# Patient Record
Sex: FEMALE | Race: WHITE | HISPANIC OR LATINO | Employment: UNEMPLOYED | URBAN - METROPOLITAN AREA
[De-identification: names, ages, dates, MRNs, and addresses within clinical notes are randomized per-mention and may not be internally consistent; named-entity substitution may affect disease eponyms.]

---
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
## 2024-02-07 ENCOUNTER — CLINICAL SUPPORT (OUTPATIENT)
Dept: OBGYN CLINIC | Facility: CLINIC | Age: 19
End: 2024-02-07

## 2024-02-07 VITALS
HEART RATE: 94 BPM | SYSTOLIC BLOOD PRESSURE: 127 MMHG | DIASTOLIC BLOOD PRESSURE: 81 MMHG | BODY MASS INDEX: 19.87 KG/M2 | WEIGHT: 113 LBS

## 2024-02-07 DIAGNOSIS — Z23 NEED FOR HPV VACCINE: Primary | ICD-10-CM

## 2024-02-07 PROCEDURE — 90471 IMMUNIZATION ADMIN: CPT

## 2024-02-07 PROCEDURE — 90651 9VHPV VACCINE 2/3 DOSE IM: CPT

## 2024-02-07 NOTE — PROGRESS NOTES
HPV vaccine given to pt in right deltoid on 2/7/2024    NDC: 2225-8760-15  LOT: [id number]  EXP: 01/26/2026

## 2024-02-08 LAB
BV WHIFF TEST VAG QL: NORMAL
CLUE CELLS SPEC QL WET PREP: NORMAL
SL AMB POCT WET MOUNT: NORMAL
T VAGINALIS VAG QL WET PREP: NORMAL
YEAST VAG QL WET PREP: NORMAL

## 2024-02-21 ENCOUNTER — ULTRASOUND (OUTPATIENT)
Dept: OBGYN CLINIC | Facility: CLINIC | Age: 19
End: 2024-02-21

## 2024-02-21 VITALS
WEIGHT: 117.4 LBS | DIASTOLIC BLOOD PRESSURE: 79 MMHG | BODY MASS INDEX: 20.8 KG/M2 | HEIGHT: 63 IN | HEART RATE: 103 BPM | SYSTOLIC BLOOD PRESSURE: 115 MMHG

## 2024-02-21 DIAGNOSIS — N92.6 MISSED MENSES: Primary | ICD-10-CM

## 2024-02-21 DIAGNOSIS — Z32.01 POSITIVE PREGNANCY TEST: ICD-10-CM

## 2024-02-21 PROBLEM — F41.9 ANXIETY: Status: RESOLVED | Noted: 2018-06-18 | Resolved: 2024-02-21

## 2024-02-21 PROBLEM — L73.8 PITYROSPORUM FOLLICULITIS: Status: RESOLVED | Noted: 2019-07-11 | Resolved: 2024-02-21

## 2024-02-21 PROBLEM — L70.0 ACNE VULGARIS: Status: RESOLVED | Noted: 2019-07-11 | Resolved: 2024-02-21

## 2024-02-21 PROBLEM — L85.8 KERATOSIS PILARIS: Status: RESOLVED | Noted: 2019-07-11 | Resolved: 2024-02-21

## 2024-02-21 LAB — SL AMB POCT URINE HCG: POSITIVE

## 2024-02-21 PROCEDURE — 76817 TRANSVAGINAL US OBSTETRIC: CPT | Performed by: NURSE PRACTITIONER

## 2024-02-21 PROCEDURE — 81025 URINE PREGNANCY TEST: CPT | Performed by: NURSE PRACTITIONER

## 2024-02-21 PROCEDURE — 99213 OFFICE O/P EST LOW 20 MIN: CPT | Performed by: NURSE PRACTITIONER

## 2024-02-21 RX ORDER — VITAMIN A ACETATE, .BETA.-CAROTENE, ASCORBIC ACID, CHOLECALCIFEROL, .ALPHA.-TOCOPHEROL ACETATE, DL-, THIAMINE MONONITRATE, RIBOFLAVIN, NIACINAMIDE, PYRIDOXINE HYDROCHLORIDE, FOLIC ACID, CYANOCOBALAMIN, CALCIUM CARBONATE, FERROUS FUMARATE, ZINC OXIDE, CUPRIC OXIDE 9.9; 120; 920; 200; 400; 2; 12; 27; 1; 20; 10; 3; 1.84; 3080; 25 MG/1; MG/1; [IU]/1; MG/1; [IU]/1; MG/1; UG/1; MG/1; MG/1; MG/1; MG/1; MG/1; MG/1; [IU]/1; MG/1
1 TABLET, FILM COATED ORAL DAILY
Qty: 90 TABLET | Refills: 4 | Status: SHIPPED | OUTPATIENT
Start: 2024-02-21

## 2024-02-21 NOTE — PATIENT INSTRUCTIONS
Thank you for your confidence in our team.   We appreciate you and welcome your feedback.   If you receive a survey from us, please take a few moments to let us know how we are doing.   Sincerely,  RANCHO Sanchez

## 2024-02-21 NOTE — PROGRESS NOTES
"Kay Barclay is a  who presents today for viability/dating ultrasound.  Patient's last menstrual period was 10/13/2023 (approximate).  She reports positive pregnancy test at home on 2 weeks ago.  She denies vaginal bleeding or abdominal/pelvic pain.    /79   Pulse 103   Ht 5' 3\" (1.6 m)   Wt 53.3 kg (117 lb 6.4 oz)   LMP 10/13/2023 (Approximate)   BMI 20.80 kg/m²   Abdomen soft and non-tender.    Transvaginal Pelvic Ultrasound:  # of fetuses: 1  Intrauterine: yes  Average FL: 1.58cm (14w5d)  EDC based on FL: 2024  Fetal cardiac activity: present  FHR: 152  Additional Findings: + gross fetal movement    Please choose how you are assigning the BENJAMIN: The LMP is unknown or uncertain, therefore the final BENJAMIN will be based on today's ultrasound.    Preliminary BENJAMIN: 2024 by ultrasound at this encounter.    Referral to [facility] ordered for ultrasound/consultation to determine Final BENJAMIN as patient is >14 weeks gestation.  Return in 1-2 weeks for OB intake.  Rx prenatal vitamins sent to pharmacy.    Current Outpatient Medications   Medication Instructions    clindamycin (CLEOCIN T) 1 % external solution Topical, 2 times daily    ketoconazole (NIZORAL) 2 % cream Apply topically twice a day to chest and back x 4 weeks straight, and then 3 times a week    ketoconazole (NIZORAL) 2 % shampoo Use on scalp every time you wash your hair. Leave on for 10 mins then rinse. Can be followed by your regular shampoo and conditioner    Prenatal Vit-Fe Fumarate-FA (Prenatal Plus Vitamin/Mineral) 27-1 MG TABS 1 tablet, Oral, Daily    tretinoin microspheres (RETIN-A MICRO) 0.04 % gel Topical, Daily at bedtime       RANCHO Sanchez  [facility]  [facility]  [address] [place] [id number]  Dept: [phone]  Dept Fax: [phone]  Loc Appt: [phone]  Loc: [phone]  Loc Fax: [phone]  "

## 2024-02-28 ENCOUNTER — INITIAL PRENATAL (OUTPATIENT)
Dept: OBGYN CLINIC | Facility: CLINIC | Age: 19
End: 2024-02-28

## 2024-02-28 VITALS
HEART RATE: 91 BPM | HEIGHT: 63 IN | SYSTOLIC BLOOD PRESSURE: 122 MMHG | DIASTOLIC BLOOD PRESSURE: 81 MMHG | BODY MASS INDEX: 20.87 KG/M2 | WEIGHT: 117.8 LBS

## 2024-02-28 DIAGNOSIS — Z3A.15 15 WEEKS GESTATION OF PREGNANCY: ICD-10-CM

## 2024-02-28 DIAGNOSIS — Z31.430 ENCOUNTER OF FEMALE FOR TESTING FOR GENETIC DISEASE CARRIER STATUS FOR PROCREATIVE MANAGEMENT: ICD-10-CM

## 2024-02-28 DIAGNOSIS — Z34.92 PRENATAL CARE IN SECOND TRIMESTER: Primary | ICD-10-CM

## 2024-02-28 PROCEDURE — 99211 OFF/OP EST MAY X REQ PHY/QHP: CPT

## 2024-02-28 NOTE — PROGRESS NOTES
"OBSTETRIC INTAKE VISIT    Kay Barclay presents today for initial OB visit and intake at 15w5d. LMP - 10/13/23.  History obtained from patient and she reports it as follows:    Past Medical History:   Diagnosis Date    Anemia     Anxiety 2018    Depressed     FTND (full term normal delivery) 2004    Scoliosis     Visual impairment      History reviewed. No pertinent surgical history.  OB History    Para Term  AB Living   1 0 0 0 0 0   SAB IAB Ectopic Multiple Live Births   0 0 0 0 0      # Outcome Date GA Lbr Jerrod/2nd Weight Sex Delivery Anes PTL Lv   1 Current              Social History     Tobacco Use    Smoking status: Never     Passive exposure: Yes    Smokeless tobacco: Never   Vaping Use    Vaping status: Never Used   Substance Use Topics    Alcohol use: Never    Drug use: Never       Current Outpatient Medications   Medication Instructions    clindamycin (CLEOCIN T) 1 % external solution Topical, 2 times daily    ketoconazole (NIZORAL) 2 % cream Apply topically twice a day to chest and back x 4 weeks straight, and then 3 times a week    ketoconazole (NIZORAL) 2 % shampoo Use on scalp every time you wash your hair. Leave on for 10 mins then rinse. Can be followed by your regular shampoo and conditioner    Prenatal Vit-Fe Fumarate-FA (Prenatal Plus Vitamin/Mineral) 27-1 MG TABS 1 tablet, Oral, Daily    tretinoin microspheres (RETIN-A MICRO) 0.04 % gel Topical, Daily at bedtime       No Known Allergies    Vitals: /81 (BP Location: Left arm, Patient Position: Sitting, Cuff Size: Standard)   Pulse 91   Ht 5' 3\" (1.6 m)   Wt 53.4 kg (117 lb 12.8 oz)   LMP 10/13/2023 (Approximate)   Breastfeeding No   BMI 20.87 kg/m²     Review of Systems:  Denies vaginal bleeding or leaking fluid.  Denies abdominal/pelvic pain or contractions.    Plan:  1. OB labwork ordered today to include Prenatal Panel, HCV antibody, Hgb fractionation cascade, Prenatal Carrier Screen Panel.  Advised " patient to have drawn within the next few days.  2. Next ultrasound is scheduled for 3/8/24.  3. Return 3/27/24 for H&P prenatal visit.  4. Referrals placed for WIC, , and [facility].  5. Given vaccines: Declines Flu vaccine.  6. Patient's depression screening was assessed with a PHQ-2 score of 1. Their PHQ-9 score was 3. Clinically patient does not have depression. No treatment is required.   in to see patient.  7. Reviewed the following educational topics with patient:   -routine prenatal visit/ultrasound/labwork schedule   -hospital for delivery and office phone/answering service contact information   -nutritional demands of pregnancy, healthy dietary habits   -listeria, toxoplasmosis, seafood precautions   -weight gain expectations (based on pre-pregnant BMI)   -exercise, rest, and sexual activity during pregnancy   -abstinence from alcohol, tobacco, and illegal drugs   -common discomforts of pregnancy and appropriate management   -OTC medications safe to use in pregnancy   -genetic screening options   -vaccines in pregnancy   -symptoms to report to OB provider    -signs of PTL and pre-eclampsia    -vaginal bleeding/leaking of fluid    -severe n/v-unable to tolerate ANY food/fluids for more than 24 hours

## 2024-02-28 NOTE — LETTER
"    [facility] REFERRAL      Date: 2/28/2024    Patient name: Kay Barclay    YOB: 2005    Estimated Date of Delivery: 8/16/24      /81 (BP Location: Left arm, Patient Position: Sitting, Cuff Size: Standard)   Pulse 91   Ht 5' 3\" (1.6 m)   Wt 53.4 kg (117 lb 12.8 oz)   LMP 10/13/2023 (Approximate)   Breastfeeding No   BMI 20.87 kg/m²         Thank you,  RANCHO Orosco            [facility] locations:   1. [facility]       [address] [place] [address]       Phone: [phone]       Fax: [phone]     2. [facility]       [address] [address]       Phone: [phone]       Fax: [phone]       "

## 2024-02-28 NOTE — PATIENT INSTRUCTIONS
WARNING SIGNS DURING PREGNANCY  Call our office at [phone] for any of the followin. Vaginal bleeding  2. Sharp abdominal pain that does not go away  3. Fever (more than 100.4 and is not relieved by Tylenol)  4. Persistent vomiting lasting greater than 24 hours  5. Chest pain   6. Pain or burning when you urinate  7. Severe headache that doesn't resolve with Tylenol  8. Blurred vision or seeing spots in your vision  9. Sudden swelling of your face or hands  10. Redness, swelling or pain in a leg  11. A sudden weight gain in just a few days  12. Decrease in your baby's movement (after 28 weeks or the 6th month of pregnancy)  13. A loss of watery fluid from your vagina - can be a gush, a trickle or continuous wetness  14. After 20 weeks of pregnancy, rhythmic cramping (greater than 4 per hour) or menstrual like low/pelvic pain

## 2024-02-28 NOTE — LETTER
2/28/2024      This letter is to confirm that Kay Barclay is pregnant and is under our care.  Her Estimated Date of Delivery: 8/16/24.    If you have any questions or concerns, please contact our office.  Thank you,    RANCHO Orosco

## 2024-03-01 ENCOUNTER — APPOINTMENT (OUTPATIENT)
Dept: LAB | Facility: HOSPITAL | Age: 19
End: 2024-03-01
Attending: NURSE PRACTITIONER
Payer: COMMERCIAL

## 2024-03-01 DIAGNOSIS — Z31.430 ENCOUNTER OF FEMALE FOR TESTING FOR GENETIC DISEASE CARRIER STATUS FOR PROCREATIVE MANAGEMENT: ICD-10-CM

## 2024-03-01 DIAGNOSIS — Z3A.15 15 WEEKS GESTATION OF PREGNANCY: ICD-10-CM

## 2024-03-01 DIAGNOSIS — Z34.92 PRENATAL CARE IN SECOND TRIMESTER: ICD-10-CM

## 2024-03-01 LAB
ABO GROUP BLD: NORMAL
AMORPH URATE CRY URNS QL MICRO: ABNORMAL /HPF
BACTERIA UR QL AUTO: ABNORMAL /HPF
BASOPHILS # BLD AUTO: 0.04 THOUSANDS/ÂΜL (ref 0–0.1)
BASOPHILS NFR BLD AUTO: 0 % (ref 0–1)
BILIRUB UR QL STRIP: NEGATIVE
BLD GP AB SCN SERPL QL: NEGATIVE
CLARITY UR: ABNORMAL
COLOR UR: YELLOW
EOSINOPHIL # BLD AUTO: 0.1 THOUSAND/ÂΜL (ref 0–0.61)
EOSINOPHIL NFR BLD AUTO: 1 % (ref 0–6)
ERYTHROCYTE [DISTWIDTH] IN BLOOD BY AUTOMATED COUNT: 12 % (ref 11.6–15.1)
GLUCOSE UR STRIP-MCNC: NEGATIVE MG/DL
HCT VFR BLD AUTO: 35.7 % (ref 34.8–46.1)
HGB BLD-MCNC: 12.2 G/DL (ref 11.5–15.4)
HGB UR QL STRIP.AUTO: NEGATIVE
HIV 1+2 AB+HIV1 P24 AG SERPL QL IA: NORMAL
HIV 2 AB SERPL QL IA: NORMAL
HIV1 AB SERPL QL IA: NORMAL
HIV1 P24 AG SERPL QL IA: NORMAL
IMM GRANULOCYTES # BLD AUTO: 0.05 THOUSAND/UL (ref 0–0.2)
IMM GRANULOCYTES NFR BLD AUTO: 1 % (ref 0–2)
KETONES UR STRIP-MCNC: NEGATIVE MG/DL
LEUKOCYTE ESTERASE UR QL STRIP: 500
LYMPHOCYTES # BLD AUTO: 1.88 THOUSANDS/ÂΜL (ref 0.6–4.47)
LYMPHOCYTES NFR BLD AUTO: 19 % (ref 14–44)
MCH RBC QN AUTO: 30.6 PG (ref 26.8–34.3)
MCHC RBC AUTO-ENTMCNC: 34.2 G/DL (ref 31.4–37.4)
MCV RBC AUTO: 90 FL (ref 82–98)
MONOCYTES # BLD AUTO: 0.66 THOUSAND/ÂΜL (ref 0.17–1.22)
MONOCYTES NFR BLD AUTO: 7 % (ref 4–12)
MUCOUS THREADS UR QL AUTO: ABNORMAL
NEUTROPHILS # BLD AUTO: 6.95 THOUSANDS/ÂΜL (ref 1.85–7.62)
NEUTS SEG NFR BLD AUTO: 72 % (ref 43–75)
NITRITE UR QL STRIP: NEGATIVE
NON-SQ EPI CELLS URNS QL MICRO: ABNORMAL /HPF
NRBC BLD AUTO-RTO: 0 /100 WBCS
PH UR STRIP.AUTO: 6.5 [PH]
PLATELET # BLD AUTO: 253 THOUSANDS/UL (ref 149–390)
PMV BLD AUTO: 10.1 FL (ref 8.9–12.7)
PROT UR STRIP-MCNC: NEGATIVE MG/DL
RBC # BLD AUTO: 3.99 MILLION/UL (ref 3.81–5.12)
RBC #/AREA URNS AUTO: ABNORMAL /HPF
RH BLD: POSITIVE
RUBV IGG SERPL IA-ACNC: 54.3 IU/ML
SP GR UR STRIP.AUTO: 1.01 (ref 1–1.04)
SPECIMEN EXPIRATION DATE: NORMAL
UROBILINOGEN UA: 1 MG/DL
WBC # BLD AUTO: 9.68 THOUSAND/UL (ref 4.31–10.16)
WBC #/AREA URNS AUTO: ABNORMAL /HPF

## 2024-03-01 PROCEDURE — 83020 HEMOGLOBIN ELECTROPHORESIS: CPT

## 2024-03-01 PROCEDURE — 36415 COLL VENOUS BLD VENIPUNCTURE: CPT

## 2024-03-01 PROCEDURE — 81001 URINALYSIS AUTO W/SCOPE: CPT

## 2024-03-01 PROCEDURE — 86900 BLOOD TYPING SEROLOGIC ABO: CPT

## 2024-03-01 PROCEDURE — 86762 RUBELLA ANTIBODY: CPT

## 2024-03-01 PROCEDURE — 86780 TREPONEMA PALLIDUM: CPT

## 2024-03-01 PROCEDURE — 85025 COMPLETE CBC W/AUTO DIFF WBC: CPT

## 2024-03-01 PROCEDURE — 87389 HIV-1 AG W/HIV-1&-2 AB AG IA: CPT

## 2024-03-01 PROCEDURE — 86901 BLOOD TYPING SEROLOGIC RH(D): CPT

## 2024-03-01 PROCEDURE — 86850 RBC ANTIBODY SCREEN: CPT

## 2024-03-01 PROCEDURE — 87340 HEPATITIS B SURFACE AG IA: CPT

## 2024-03-01 PROCEDURE — 87086 URINE CULTURE/COLONY COUNT: CPT

## 2024-03-01 PROCEDURE — 86803 HEPATITIS C AB TEST: CPT

## 2024-03-01 PROCEDURE — 86706 HEP B SURFACE ANTIBODY: CPT

## 2024-03-02 ENCOUNTER — TELEPHONE (OUTPATIENT)
Dept: LABOR AND DELIVERY | Facility: HOSPITAL | Age: 19
End: 2024-03-02

## 2024-03-02 PROBLEM — Z78.9 NOT IMMUNE TO HEPATITIS B VIRUS: Status: ACTIVE | Noted: 2024-03-02

## 2024-03-02 LAB
BACTERIA UR CULT: NORMAL
HBV SURFACE AB SER-ACNC: 5.26 MIU/ML
HBV SURFACE AG SER QL: NORMAL
HCV AB SER QL: NORMAL
TREPONEMA PALLIDUM IGG+IGM AB [PRESENCE] IN SERUM OR PLASMA BY IMMUNOASSAY: NORMAL

## 2024-03-06 LAB
CITATION REF LAB TEST: NORMAL
CLINICAL INFO: NORMAL
ETHNIC BACKGROUND STATED: NORMAL
GENE DIS ANL CARRIER INTERP-IMP: NORMAL
GENE MUT TESTED BLD/T: NORMAL
HGB A MFR BLD: 2.6 % (ref 1.8–3.2)
HGB A MFR BLD: 97.4 % (ref 96.4–98.8)
HGB F MFR BLD: 0 % (ref 0–2)
HGB FRACT BLD-IMP: NORMAL
HGB S MFR BLD: 0 %
LAB DIRECTOR NAME PROVIDER: NORMAL
REASON FOR REFERRAL (NARRATIVE): NORMAL
RECOMMENDATION PATIENT DOC-IMP: NORMAL
REF LAB TEST METHOD: NORMAL
SERVICE CMNT-IMP: NORMAL
SMN1 GENE MUT ANL BLD/T: NORMAL
SPECIMEN SOURCE: NORMAL

## 2024-03-08 ENCOUNTER — ROUTINE PRENATAL (OUTPATIENT)
Dept: PERINATAL CARE | Facility: CLINIC | Age: 19
End: 2024-03-08
Payer: COMMERCIAL

## 2024-03-08 ENCOUNTER — PATIENT OUTREACH (OUTPATIENT)
Dept: OBGYN CLINIC | Facility: CLINIC | Age: 19
End: 2024-03-08

## 2024-03-08 VITALS
DIASTOLIC BLOOD PRESSURE: 80 MMHG | WEIGHT: 120 LBS | HEIGHT: 63 IN | BODY MASS INDEX: 21.26 KG/M2 | HEART RATE: 108 BPM | SYSTOLIC BLOOD PRESSURE: 101 MMHG

## 2024-03-08 DIAGNOSIS — Z32.01 POSITIVE PREGNANCY TEST: ICD-10-CM

## 2024-03-08 DIAGNOSIS — Z3A.19 19 WEEKS GESTATION OF PREGNANCY: ICD-10-CM

## 2024-03-08 DIAGNOSIS — Z36.3 ENCOUNTER FOR ANTENATAL SCREENING FOR MALFORMATION: Primary | ICD-10-CM

## 2024-03-08 PROCEDURE — 76817 TRANSVAGINAL US OBSTETRIC: CPT | Performed by: STUDENT IN AN ORGANIZED HEALTH CARE EDUCATION/TRAINING PROGRAM

## 2024-03-08 PROCEDURE — 76805 OB US >/= 14 WKS SNGL FETUS: CPT | Performed by: STUDENT IN AN ORGANIZED HEALTH CARE EDUCATION/TRAINING PROGRAM

## 2024-03-08 PROCEDURE — 99243 OFF/OP CNSLTJ NEW/EST LOW 30: CPT | Performed by: STUDENT IN AN ORGANIZED HEALTH CARE EDUCATION/TRAINING PROGRAM

## 2024-03-08 PROCEDURE — 36415 COLL VENOUS BLD VENIPUNCTURE: CPT | Performed by: STUDENT IN AN ORGANIZED HEALTH CARE EDUCATION/TRAINING PROGRAM

## 2024-03-08 NOTE — PROGRESS NOTES
Patient chose to have [facility] [id number] Non-Invasive Prenatal Screen 210911 MT21 PLUS Core + SCA, NO gender.  Patient choose billed through insurance.     Patient given brochure and is aware [facility] will contact patient's insurance and coordinate coverage.  Provided [facility] contact information. General inquiries [phone], Cost estimates [phone] and [facility] Billing [phone]. Website [email or web address] films.     Blood collection tubes labeled with patient identifiers (name, medical record number, and date of birth).     Filled out [facility] order form. Patient chose to have blood drawn in our Maternal Fetal Medicine office. Blood work drawn by FRANNY Olguin RNC-OB. .   If patient had blood work in office: Blood drawn from left arm. Needle used with a straight needle.   If patient chose to have blood work drawn at a [facility] lab we requested patient notify MFM (via phone call or [employer] message) when blood collected so office can follow up on results.     Copy of lab order scanned to Epic media.     Maternal Fetal Medicine will have results in approximately 5-7 business days and will call patient or notify via [employer].  Patient aware viewing lab result online will reveal fetal sex if ordered.    Patient verbalized understanding of all instructions and no questions at this time.

## 2024-03-08 NOTE — PROGRESS NOTES
"[facility]: Ms. Barclay was seen today for anatomic survey and cervical length screening ultrasound.  See ultrasound report under \"OB Procedures\" tab.        Physical Exam  Constitutional:       General: She is not in acute distress.     Appearance: Normal appearance.   HENT:      Head: Normocephalic and atraumatic.   Eyes:      Extraocular Movements: Extraocular movements intact.   Cardiovascular:      Rate and Rhythm: Normal rate.   Pulmonary:      Effort: Pulmonary effort is normal. No respiratory distress.   Skin:     Findings: No erythema or rash.   Neurological:      Mental Status: She is alert and oriented to person, place, and time.   Psychiatric:         Mood and Affect: Mood normal.         Behavior: Behavior normal.         Please don't hesitate to contact our office with any concerns or questions.  -Winsome Ibanez MD      "

## 2024-03-08 NOTE — PROGRESS NOTES
DOROTHY VAZQUEZ was referred by RANCHO Carreon to complete a PN SW assessment in the second trimester. Pt is , she is 64a0vYC. Her BENJAMIN is [birth year]24. She is english speaking. DOROTHY VAZQUEZ completed the assessment by phone today.     Pt reports this pregnancy is unplanned but welcomed. Pt states she was shocked and scared when she found out but she is now accepting of the pregnancy. Pt reports FOB is in denial of the pregnancy and is not currently involved. This has made her sad about the situation. Pt reports good family support. She lives with her mother, her step father and her brother. Pt works in a [employer]. Pts mother drives her to appointments. Pt has MA and she is trying to apply for WIC but is having difficulty getting ahold of the office. Pt hs no concerns with obtaining baby supplies.     Pt has MH dx of anxiety and ADHD per her mother.     Pt and mother interested in a home visiting program, mother reports they have a family member who utilizes one of these services but she is not sure which program it is, mother and pt would like DOROTHY VAZQUEZ to send them information for [facility] and [facility] today. DOROTHY VAZQUEZ sent via find help    DOROTHY VAZQUEZ also referred to WIC office at [place] via find help.     DOROTHY VAZQUEZ educated pt on the benefits of MH services, sent her information for preventive measures via find help as well today.     DOROTHY VAZQUEZ will f/u in one week.

## 2024-03-08 NOTE — LETTER
"2024     RANCHO Sanchez  [address] [address]    Patient: Kay Barclay   YOB: 2005   Date of Visit: 3/8/2024       Dear Dr. Jones:    Thank you for referring Kay Barclay to me for evaluation. Below are my notes for this consultation.    If you have questions, please do not hesitate to call me. I look forward to following your patient along with you.         Sincerely,        Winsome Ibanez MD        CC: No Recipients    Winsome Ibanez MD  3/8/2024 10:11 AM  Sign when Signing Visit  [facility]: Ms. Barclay was seen today for anatomic survey and cervical length screening ultrasound.  See ultrasound report under \"OB Procedures\" tab.        Physical Exam  Constitutional:       General: She is not in acute distress.     Appearance: Normal appearance.   HENT:      Head: Normocephalic and atraumatic.   Eyes:      Extraocular Movements: Extraocular movements intact.   Cardiovascular:      Rate and Rhythm: Normal rate.   Pulmonary:      Effort: Pulmonary effort is normal. No respiratory distress.   Skin:     Findings: No erythema or rash.   Neurological:      Mental Status: She is alert and oriented to person, place, and time.   Psychiatric:         Mood and Affect: Mood normal.         Behavior: Behavior normal.         Please don't hesitate to contact our office with any concerns or questions.  -Winsome Ibanez MD         "

## 2024-03-08 NOTE — PROGRESS NOTES
Ultrasound Probe Disinfection    A transvaginal ultrasound was performed.   Prior to use, disinfection was performed with High Level Disinfection Process (Shahiya).  Probe serial number [id number]: [id number] was used.      Mirella Strauss  03/08/24  9:03 AM

## 2024-03-11 LAB
CFDNA.FET/CFDNA.TOTAL SFR FETUS: NORMAL %
CITATION REF LAB TEST: NORMAL
FET 13+18+21+X+Y ANEUP PLAS.CFDNA: NORMAL
FET CHR 21 TS PLAS.CFDNA QL: NORMAL
FET CHR 21 TS PLAS.CFDNA QL: NORMAL
FET MS X RISK WBC.DNA+CFDNA QL: NORMAL
FET SEX PLAS.CFDNA DOSAGE CFDNA: NORMAL
FET TS 13 RISK PLAS.CFDNA QL: NORMAL
FET X + Y ANEUP RISK PLAS.CFDNA SEQ-IMP: NORMAL
GA EST FROM CONCEPTION DATE: NORMAL D
GESTATIONAL AGE > 9:: NORMAL
LAB DIRECTOR NAME PROVIDER: NORMAL
LAB DIRECTOR NAME PROVIDER: NORMAL
LABORATORY COMMENT REPORT: NORMAL
LIMITATIONS OF THE TEST: NORMAL
NEGATIVE PREDICTIVE VALUE: NORMAL
PERFORMANCE CHARACTERISTICS: NORMAL
POSITIVE PREDICTIVE VALUE: NORMAL
REF LAB TEST METHOD: NORMAL
SL AMB NOTE:: NORMAL
TEST PERFORMANCE INFO SPEC: NORMAL

## 2024-03-14 ENCOUNTER — PATIENT OUTREACH (OUTPATIENT)
Dept: OBGYN CLINIC | Facility: CLINIC | Age: 19
End: 2024-03-14

## 2024-03-14 LAB
CITATION REF LAB TEST: NORMAL
CLINICAL INFO: NORMAL
ETHNIC BACKGROUND STATED: NORMAL
FMR1 GENE CGG RPT BLD/T QL: NORMAL
GENE DIS ANL CARRIER INTERP-IMP: NORMAL
INDICATION: NORMAL
LAB DIRECTOR NAME PROVIDER: NORMAL
RECOMMENDATION PATIENT DOC-IMP: NORMAL
REF LAB TEST METHOD: NORMAL
SERVICE CMNT-IMP: NORMAL
SPECIMEN SOURCE: NORMAL

## 2024-03-20 NOTE — PROGRESS NOTES
DOROTHY VAZQUEZ called pt today to f/u and see if she had her WIC appt scheduled or if she was interested in either [facility] or [facility]. DOROTHY VAZQUEZ left a  for a return call.

## 2024-03-27 ENCOUNTER — INITIAL PRENATAL (OUTPATIENT)
Dept: OBGYN CLINIC | Facility: CLINIC | Age: 19
End: 2024-03-27

## 2024-03-27 ENCOUNTER — APPOINTMENT (OUTPATIENT)
Dept: LAB | Facility: HOSPITAL | Age: 19
End: 2024-03-27
Payer: COMMERCIAL

## 2024-03-27 ENCOUNTER — PATIENT OUTREACH (OUTPATIENT)
Dept: OBGYN CLINIC | Facility: CLINIC | Age: 19
End: 2024-03-27

## 2024-03-27 VITALS
BODY MASS INDEX: 21.69 KG/M2 | SYSTOLIC BLOOD PRESSURE: 109 MMHG | WEIGHT: 122.4 LBS | HEART RATE: 89 BPM | HEIGHT: 63 IN | DIASTOLIC BLOOD PRESSURE: 71 MMHG

## 2024-03-27 DIAGNOSIS — Z34.92 PRENATAL CARE IN SECOND TRIMESTER: ICD-10-CM

## 2024-03-27 DIAGNOSIS — Z3A.22 22 WEEKS GESTATION OF PREGNANCY: ICD-10-CM

## 2024-03-27 DIAGNOSIS — Z34.92 PRENATAL CARE IN SECOND TRIMESTER: Primary | ICD-10-CM

## 2024-03-27 PROCEDURE — 87591 N.GONORRHOEAE DNA AMP PROB: CPT | Performed by: NURSE PRACTITIONER

## 2024-03-27 PROCEDURE — 82105 ALPHA-FETOPROTEIN SERUM: CPT

## 2024-03-27 PROCEDURE — 87491 CHLMYD TRACH DNA AMP PROBE: CPT | Performed by: NURSE PRACTITIONER

## 2024-03-27 PROCEDURE — 99213 OFFICE O/P EST LOW 20 MIN: CPT | Performed by: NURSE PRACTITIONER

## 2024-03-27 NOTE — PATIENT INSTRUCTIONS
Thank you for your confidence in our team.   We appreciate you and welcome your feedback.   If you receive a survey from us, please take a few moments to let us know how we are doing.   Sincerely,  RANCHO Sanchez       WARNING SIGNS DURING PREGNANCY  Call our office at [phone] for any of the followin. Vaginal bleeding  2. Sharp abdominal pain that does not go away  3. Fever (more than 100.4 and is not relieved by Tylenol)  4. Persistent vomiting lasting greater than 24 hours  5. Chest pain   6. Pain or burning when you urinate  7. Severe headache that doesn't resolve with Tylenol  8. Blurred vision or seeing spots in your vision  9. Sudden swelling of your face or hands  10. Redness, swelling or pain in a leg  11. A sudden weight gain in just a few days  12. Decrease in your baby's movement (after 28 weeks or the 6th month of pregnancy)  13. A loss of watery fluid from your vagina - can be a gush, a trickle or continuous wetness  14. After 20 weeks of pregnancy, rhythmic cramping (greater than 4 per hour) or menstrual like low/pelvic pain

## 2024-03-27 NOTE — PROGRESS NOTES
Kay Barclay presents today for routine OB visit at 22w2d.  Blood Pressure: 109/71  Wt=55.5 kg (122 lb 6.4 oz); Body mass index is 21.68 kg/m².; TWG=3.357 kg (7 lb 6.4 oz)  Fetal Heart Rate: 152; Fundal Height (cm): 20 cm  Abdomen: gravid, soft, non-tender.  She reports no complaints.  Denies uterine contractions or persistent cramping.  Denies vaginal bleeding or leaking of fluid.  Reports minimal fetal movement.  Scheduled for ultrasound 24.  Reviewed common discomforts of pregnancy in second trimester and warning signs.  Advised to continue medications and return in 4 weeks.      Current Outpatient Medications   Medication Instructions    Prenatal Vit-Fe Fumarate-FA (Prenatal Plus Vitamin/Mineral) 27-1 MG TABS 1 tablet, Oral, Daily       Laboratory workup: initial OB labs (done 3/1/24)    Genetic Screening: desires NIPS, MSAFP ordered 3/27/24    Vaccinations: influenza (declined 24); Tdap (will offer after 27 weeks); RSV (not indicated outside RSV season); COVID-19 (declined 3/27/24)    Postpartum contraception: desires Nexplanon    Fetal Ultrasounds:  3/8/24 (19w4d) EDC confirmed, no previa, kia WNL & complete      G1 Problems (from 24 to present)       Problem Noted Resolved    Not immune to HBV 3/2/2024 by RANCHO Sanchez No    Overview Signed 3/2/2024 11:40 AM by RANCHO Sanchez     Needs HBV vaccine postpartum               Past Medical History:   Diagnosis Date    Anxiety     Depression     Scoliosis      Past Surgical History:   Procedure Laterality Date    NO PAST SURGERIES       OB History          1    Para   0    Term   0       0    AB   0    Living   0         SAB   0    IAB   0    Ectopic   0    Multiple   0    Live Births   0               Social History     Tobacco Use    Smoking status: Never    Smokeless tobacco: Never   Vaping Use    Vaping status: Never Used   Substance Use Topics    Alcohol use: Never    Drug use: Never       
4 = No assist / stand by assistance

## 2024-03-27 NOTE — PROGRESS NOTES
DOROTHY VAZQUEZ called to check in, there was no answer. DOROTHY VAZQUEZ left a VM, will f/u on 4/26 at her next appointment.

## 2024-03-28 LAB
C TRACH DNA SPEC QL NAA+PROBE: NEGATIVE
CFTR FULL MUT ANL BLD/T SEQ: NORMAL
CITATION REF LAB TEST: NORMAL
CLINICAL INFO: NORMAL
ETHNIC BACKGROUND STATED: NORMAL
GENE DIS ANL CARRIER INTERP-IMP: NORMAL
INDICATION: NORMAL
LAB DIRECTOR NAME PROVIDER: NORMAL
N GONORRHOEA DNA SPEC QL NAA+PROBE: NEGATIVE
RECOMMENDATION PATIENT DOC-IMP: NORMAL
REF LAB TEST METHOD: NORMAL
SERVICE CMNT-IMP: NORMAL
SPECIMEN SOURCE: NORMAL

## 2024-03-29 LAB
2ND TRIMESTER 4 SCREEN SERPL-IMP: NORMAL
AFP ADJ MOM SERPL: 1.29
AFP INTERP AMN-IMP: NORMAL
AFP INTERP SERPL-IMP: NORMAL
AFP INTERP SERPL-IMP: NORMAL
AFP SERPL-MCNC: 117.8 NG/ML
AGE AT DELIVERY: 20 YR
GA METHOD: NORMAL
GA: 22.3 WEEKS
IDDM PATIENT QL: NO
MULTIPLE PREGNANCY: NO
NEURAL TUBE DEFECT RISK FETUS: 4947 %

## 2024-04-02 ENCOUNTER — TELEPHONE (OUTPATIENT)
Age: 19
End: 2024-04-02

## 2024-04-02 NOTE — TELEPHONE ENCOUNTER
----- Message from Winsome Ibanez MD sent at 2024  9:36 AM EDT -----  Hi  staff,  I reviewed this patient's NIPS, which resulted as low risk. She does not have MyChart. Can she be contacted to ensure she is aware of the results?  Thank you!

## 2024-04-24 ENCOUNTER — ROUTINE PRENATAL (OUTPATIENT)
Dept: OBGYN CLINIC | Facility: CLINIC | Age: 19
End: 2024-04-24

## 2024-04-24 ENCOUNTER — PATIENT OUTREACH (OUTPATIENT)
Dept: OBGYN CLINIC | Facility: CLINIC | Age: 19
End: 2024-04-24

## 2024-04-24 VITALS
SYSTOLIC BLOOD PRESSURE: 124 MMHG | WEIGHT: 125 LBS | BODY MASS INDEX: 22.15 KG/M2 | DIASTOLIC BLOOD PRESSURE: 83 MMHG | HEART RATE: 108 BPM | HEIGHT: 63 IN

## 2024-04-24 DIAGNOSIS — Z34.02 ENCOUNTER FOR SUPERVISION OF NORMAL FIRST PREGNANCY IN SECOND TRIMESTER: Primary | ICD-10-CM

## 2024-04-24 DIAGNOSIS — Z34.01 ENCOUNTER FOR SUPERVISION OF NORMAL FIRST PREGNANCY IN FIRST TRIMESTER: ICD-10-CM

## 2024-04-24 DIAGNOSIS — K64.9 HEMORRHOIDS, UNSPECIFIED HEMORRHOID TYPE: Primary | ICD-10-CM

## 2024-04-24 DIAGNOSIS — Z3A.22 22 WEEKS GESTATION OF PREGNANCY: ICD-10-CM

## 2024-04-24 PROBLEM — Z3A.26 26 WEEKS GESTATION OF PREGNANCY: Status: ACTIVE | Noted: 2024-03-27

## 2024-04-24 PROCEDURE — 99213 OFFICE O/P EST LOW 20 MIN: CPT | Performed by: OBSTETRICS & GYNECOLOGY

## 2024-04-24 RX ORDER — HYDROCORTISONE 25 MG/G
CREAM TOPICAL 2 TIMES DAILY
Qty: 28 G | Refills: 1 | Status: SHIPPED | OUTPATIENT
Start: 2024-04-24 | End: 2024-05-08

## 2024-04-24 NOTE — PROGRESS NOTES
"Assessment & Plan  18 y.o.  at 26w2d presenting for routine prenatal visit.       Problem List       Not immune to HBV    Overview     Needs HBV vaccine postpartum         26 weeks gestation of pregnancy     Other Visit Diagnoses       Encounter for supervision of normal first pregnancy in second trimester    -  Primary          ____________________________________________________________  Subjective  She is without complaint.   She denies contractions, loss of fluid, or vaginal bleeding.   She feels regular fetal movements.       Objective  /83 (BP Location: Right arm, Patient Position: Sitting)   Pulse (!) 108   Ht 5' 3\" (1.6 m)   Wt 56.7 kg (125 lb)   LMP  (LMP Unknown)   BMI 22.14 kg/m²   FHR: 150's via doppler    Physical Exam  Constitutional:       Appearance: She is well-developed.   Cardiovascular:      Rate and Rhythm: Normal rate and regular rhythm.      Heart sounds: Normal heart sounds. No murmur heard.     No friction rub. No gallop.   Pulmonary:      Effort: Pulmonary effort is normal. No respiratory distress.      Breath sounds: No wheezing.   Abdominal:      Palpations: Abdomen is soft.      Tenderness: There is no abdominal tenderness.   Musculoskeletal:         General: No tenderness.   Neurological:      Mental Status: She is alert and oriented to person, place, and time.   Vitals reviewed.          Patient's Active Problem List  Patient Active Problem List   Diagnosis    Not immune to HBV    26 weeks gestation of pregnancy           Tanisha aRi MD  2024  11:13 AM         "

## 2024-04-24 NOTE — PROGRESS NOTES
DOROTHY VAZQUEZ f/u with the pt and her mother in person today in the office. Pt reports she has not f/u with [facility], her mother reports that she still is highly encouraging a program because pt is very anxious about child birth to the point where she does become tearful at home. We reviewed all the support a home visiting nurse would provide. Mother encouraged further during the visit today. DOROTHY VAZQUEZ also discussed prepared childbirth classes available through [facility] and the benefit of signing up for these and provided further information.     DOROTHY VAZQUEZ encouraged the pt to outreach for any needs, will f/u in three months.

## 2024-05-03 ENCOUNTER — APPOINTMENT (OUTPATIENT)
Dept: LAB | Facility: HOSPITAL | Age: 19
End: 2024-05-03
Payer: COMMERCIAL

## 2024-05-03 DIAGNOSIS — Z34.01 ENCOUNTER FOR SUPERVISION OF NORMAL FIRST PREGNANCY IN FIRST TRIMESTER: ICD-10-CM

## 2024-05-03 LAB
ERYTHROCYTE [DISTWIDTH] IN BLOOD BY AUTOMATED COUNT: 12.3 % (ref 11.6–15.1)
GLUCOSE 1H P 50 G GLC PO SERPL-MCNC: 124 MG/DL (ref 70–134)
HCT VFR BLD AUTO: 36.5 % (ref 34.8–46.1)
HGB BLD-MCNC: 11.5 G/DL (ref 11.5–15.4)
MCH RBC QN AUTO: 30.7 PG (ref 26.8–34.3)
MCHC RBC AUTO-ENTMCNC: 31.5 G/DL (ref 31.4–37.4)
MCV RBC AUTO: 98 FL (ref 82–98)
PLATELET # BLD AUTO: 235 THOUSANDS/UL (ref 149–390)
PMV BLD AUTO: 9.7 FL (ref 8.9–12.7)
RBC # BLD AUTO: 3.74 MILLION/UL (ref 3.81–5.12)
WBC # BLD AUTO: 9.56 THOUSAND/UL (ref 4.31–10.16)

## 2024-05-03 PROCEDURE — 82950 GLUCOSE TEST: CPT

## 2024-05-03 PROCEDURE — 86780 TREPONEMA PALLIDUM: CPT

## 2024-05-03 PROCEDURE — 85027 COMPLETE CBC AUTOMATED: CPT

## 2024-05-03 PROCEDURE — 36415 COLL VENOUS BLD VENIPUNCTURE: CPT

## 2024-05-04 LAB — TREPONEMA PALLIDUM IGG+IGM AB [PRESENCE] IN SERUM OR PLASMA BY IMMUNOASSAY: NORMAL

## 2024-05-08 ENCOUNTER — ROUTINE PRENATAL (OUTPATIENT)
Dept: OBGYN CLINIC | Facility: CLINIC | Age: 19
End: 2024-05-08

## 2024-05-08 VITALS
WEIGHT: 126.2 LBS | DIASTOLIC BLOOD PRESSURE: 75 MMHG | HEIGHT: 63 IN | BODY MASS INDEX: 22.36 KG/M2 | SYSTOLIC BLOOD PRESSURE: 110 MMHG | HEART RATE: 90 BPM

## 2024-05-08 DIAGNOSIS — Z23 ENCOUNTER FOR IMMUNIZATION: ICD-10-CM

## 2024-05-08 DIAGNOSIS — Z34.93 PRENATAL CARE IN THIRD TRIMESTER: Primary | ICD-10-CM

## 2024-05-08 DIAGNOSIS — O26.843 UTERINE SIZE DATE DISCREPANCY PREGNANCY, THIRD TRIMESTER: ICD-10-CM

## 2024-05-08 DIAGNOSIS — Z3A.28 28 WEEKS GESTATION OF PREGNANCY: ICD-10-CM

## 2024-05-08 PROCEDURE — 90715 TDAP VACCINE 7 YRS/> IM: CPT | Performed by: NURSE PRACTITIONER

## 2024-05-08 PROCEDURE — 90471 IMMUNIZATION ADMIN: CPT | Performed by: NURSE PRACTITIONER

## 2024-05-08 PROCEDURE — 99213 OFFICE O/P EST LOW 20 MIN: CPT | Performed by: NURSE PRACTITIONER

## 2024-05-08 NOTE — PATIENT INSTRUCTIONS
Thank you for your confidence in our team.   We appreciate you and welcome your feedback.   If you receive a survey from us, please take a few moments to let us know how we are doing.   Sincerely,  RANCHO Sanchez       The Third Trimester  (28-42 weeks)  YOUR BABY   * your baby sucks its thumb now!   * your baby can hear voices and respond to touch…..so talk to him or her!!   * your baby’s brain grows and develops most in the last 2 months of pregnancy   * baby’s head and bones are soft and flexible so they can fit through the birth canal   * baby’s movements change towards the end of pregnancy because there is less room for kicking and stretching in your belly   * baby’s lungs are not fully developed and completely ready to breathe on their own until the last 3-4 weeks before your due date    YOUR BODY   * your belly is growing a lot now   * it may become more difficult to sleep well at night or to be as active as you usually are   * you may sweat more than usual   * you will become more off-balance……be careful not to fall!!   * you may develop hemorrhoids (which can be painful and make it difficult to sit down)   * the last two months of pregnancy can become very uncomfortable……with backaches, headaches, and heartburn   * you can start to have contractions…….as long as they are irregular and less than 5 per hour, this is a normal part of your body getting ready to have a baby   * your cervix may start to thin out and open up……to get ready for delivery   * you may find yourself needing to “pee” very often…….because baby is pressing on your bladder so much   * you may get out of breathe more quickly than usual      FETAL KICK COUNTS    In the third trimester (after 28 weeks gestation) you should be performing fetal kick counts every day.  Your baby should move at least 10 times in 2 hours during an active time, once a day.    Choose atime of day when your baby is most active.  Try to do this around the  same time each day.  Get into a comfortable position and then write down the time your baby first moves.  Count each movement until the baby moves 10 times.  These movements include kicks, punches, nudges, flutters, or rolls.  This can take anywhere from 5 minutes to 2 hours.  Write down the time you feel the baby's 10th movement.    If 2 hours has passed and your baby has not moved at least 10 times, you should CALL THE OFFICE RIGHT AWAY.  [phone].          PREMATURE LABOR     When to call [phone]:  * I need to call immediately if I have even a small amount of LIQUID leaking from my vagina, with or without contractions.   * I need to call if I am BLEEDING from my vagina.   * I need to call if I am feeling CRAMPING that continues after drinking 2-3 glasses of water and lying down on my side for one hour and that feels like I am having a period.   * I need to call if I feel CONTRACTIONS  more than 4 times in an hour that feels like the baby is “balling up” even after I try drinking 2-3 glasses of water and lying down on my side for an hour.   * I need to call if I notice a change in my vaginal DISCHARGE.   * I need to call if I am feeling PELVIC PRESSURE  that feels like the baby is pushing down into my vagina and lasts more than an hour.   * I need to call if I have LOW BACKACHE which is new and near my tailbone.  It may either come and go several times during an hour or stay there constantly.          PRE-ECLAMPSIA     What is it?   Pre-eclampsia is a serious disease that can occur during pregnancy related to high blood pressures.  It can happen to any woman.     Why should I care?   Women who develop pre-eclampsia have serious risks which can include seizures, stroke, organ damage, premature birth of their baby.  In the very worst cases, it can cause death of the mother and/or their baby.     What should I pay attention to?   Signs and symptoms of pre-eclampsia can include:   * Severe swelling of face or  hands    * A headache that will not go away even after you have taken Tylenol   * Seeing spots or changes in eyesight    * Pain in the upper abdomen or shoulder    * New nausea and vomiting (in the second half of pregnancy)    * Sudden weight gain    * Difficulty breathing     What should I do?   If you experience any of the above symptoms of pre-eclampsia, contact your OB provider.  Finding pre-eclampsia early is important for you and your baby.  Call us at [phone]..      BREASTFEEDING     BENEFITS FOR BABIES   * stronger immune systems (less allergies, eczema, asthma, and childhood cancers)   * less diarrhea and constipation or other GI diseases   * fewer colds and ear infections   * better vision and teeth (fewer cavities)   * improves IQ   * lower rates of diabetes and obesity in childhood     BENEFITS FOR MOMS   * promotes faster weight loss after delivery   * lower risk for postpartum depression   * lower risk for breast, uterine, and ovarian cancers   * lower risk for osteoporosis developing with age   * easier than formula - is always right with you, clean, and the right temperature   * less expensive than formula……it’s FREE !!!!     KEYS TO SUCCESSFUL BREASTFEEDING   * keep baby skin-to-skin until after first feeding event   * keep baby in your room with you during your hospital stay after delivery   * avoid any bottle feedings (unless medically necessary)   * limit the use of pacifiers and swaddling   * ask for help if you are having any issues……lactation consultants (who specialize in breastfeeding) are available to help you   * a healthy diet for mom……eating a variety of foods and portions in moderation    THINGS YOU SHOULD KNOW ABOUT BREASTFEEDING   * most medications are considered compatible with breastfeeding by the American Academy of Pediatrics, but you should check with your health care provider or lactation consultant prior to taking a new medication……just to be sure it is safe   * alcohol  (beer, wine, liquor) can be passed from mother to baby through breast milk……an occasional, social drink is deemed acceptable by the American Academy of Pediatrics…..more than that should be avoided   * breastfeeding is NOT an effective method of birth control   * nicotine (in cigarettes) can pass from mother to baby through breast milk…..however, for mothers who smoke, it is still healthier to breastfeed than use formula   * caffeine should be limited to 1-3 cups per day……includes coffee, soda, energy drinks         PERINEAL / VAGINAL MASSAGE    What can I do now to decrease my chances of tearing during delivery?  Massaging around the vaginal opening by you (or your partner), either antepartum (before birth) or during the second stage of labor, can reduce the likelihood of perineal tearing during childbirth.  Likewise, the use of warm packs held on the perineum during the pushing stage of labor can reduce the severity of your tear.  This will happen during the pushing stage of labor.  At home, you can also help reduce the chances of injury that may occur during the birth of your child through perineal massage.    When should I do this?  Starting around or shortly after 34 weeks of pregnancy, you or your partner should provide 5-10 minutes of vaginal massage 1-4 times per week.    How?  Use either almond, coconut, or olive oil and water mixture on 1 or 2 fingers (depending on comfort).  Insert finger(s) 3-5cm into the vagina.  Apply sweeping downward/sideward pressure from 3 to 9 o'clock for 5-10 minutes, 1-4 times per week.          WARNING SIGNS DURING PREGNANCY  Call our office at [phone] if you experience any of the followin. Vaginal bleeding  2. Sharp abdominal pain that does not go away  3. Fever (more than 100.4 and is not relieved by Tylenol)  4. Persistent vomiting lasting greater than 24 hours  5. Chest pain   6. Pain or burning when you urinate  7. Severe headache that doesn't resolve with  Tylenol  8. Blurred vision or seeing spots in your vision  9. Sudden swelling of your face or hands  10. Redness, swelling or pain in a leg  11. A sudden weight gain in just a few days  12. Decrease in your baby's movement (after 28 weeks or the 6th month of pregnancy)  13. A loss of watery fluid from your vagina - can be a gush, a trickle or continuous wetness  14. After 20 weeks of pregnancy, rhythmic cramping (greater than 4 per hour) or menstrual like low/pelvic pain          VACCINES IN PREGNANCY    TDAP  Whooping cough (or pertussis) can be serious for anyone, but for your , it can be life-threatning.  Up to 20 babies die each year in the [place] Due to whooping cough.  About half of babies younger than 1 year old who get whooping cough need treatment in the hospital.  The younger the baby is when he or she gets whooping cough, the more likely he or she will need to be treated in a hospital.  When you receive the whooping cough vaccine (Tdap) during your pregnancy, your body will create protective antibodies and pass some of them to your baby before birth.  These antibodies can help protect your baby from getting whooping cough until they are old enough to be vaccinated themselves (usually around 6 months of age).    INFLUENZA  Changes in your immune, heart, and lung functions during pregnancy make you more likely to get seriously ill from the flu.  Catching the flu also increases your chances for serious problems for your developing baby, including premature labor and delivery.  It is recommended that all women who are pregnant during flu season should receive an influenza vaccine.

## 2024-05-08 NOTE — PROGRESS NOTES
Kay Barclay presents today for routine OB visit at 28w2d.  Blood Pressure: 110/75  Wt=57.2 kg (126 lb 3.2 oz); Body mass index is 22.36 kg/m².; TWG=5.08 kg (11 lb 3.2 oz)  Fetal Heart Rate: 156; Fundal Height (cm): 26 cm  Abdomen: gravid, soft, non-tender.  She reports no complaints.  She reports occasional uterine contractions.  Denies vaginal bleeding or leaking of fluid.  Reports adequate fetal movement of at least 10 movements in 2 hours once daily.  Scheduled for ultrasound 5/31/24.  Referral ordered for MFM growth ultrasound to be done sooner given size <dates measurement of fundal height today.  Reviewed premature labor precautions and fetal kick counts.  Advised to continue medications and return in 2 weeks.      Current Outpatient Medications   Medication Instructions    Prenatal Vit-Fe Fumarate-FA (Prenatal Plus Vitamin/Mineral) 27-1 MG TABS 1 tablet, Oral, Daily       Laboratory workup: initial OB labs (done 3/1/24); 28 week labs (done 5/3/24)    Genetic Screening: desires NIPS, MSAFP negative    Vaccinations: influenza (declined 2/28/24); Tdap (given 5/8/24); RSV (not indicated outside RSV season); COVID-19 (declined 3/27/24)    Postpartum contraception: desires Nexplanon    Fetal Ultrasounds:  3/8/24 (19w4d) EDC confirmed, no previa, kia WNL & complete      G1 Problems (from 02/21/24 to present)       Problem Noted Resolved    Not immune to HBV 3/2/2024 by RANCHO Sanchez No    Overview Signed 3/2/2024 11:40 AM by RANCHO Sanchez     Needs HBV vaccine postpartum

## 2024-05-08 NOTE — PROGRESS NOTES
Patient given Tdap in right deltoid.   28 week teaching done. Breast pump ordered.     NDC# 14956-129-30  LOT# [id number]  EXP 2/10/2026

## 2024-05-09 LAB
DME PARACHUTE DELIVERY DATE REQUESTED: NORMAL
DME PARACHUTE ITEM DESCRIPTION: NORMAL
DME PARACHUTE ORDER STATUS: NORMAL
DME PARACHUTE SUPPLIER NAME: NORMAL
DME PARACHUTE SUPPLIER PHONE: NORMAL

## 2024-05-24 ENCOUNTER — ROUTINE PRENATAL (OUTPATIENT)
Dept: OBGYN CLINIC | Facility: CLINIC | Age: 19
End: 2024-05-24

## 2024-05-24 VITALS
WEIGHT: 129.4 LBS | HEIGHT: 63 IN | DIASTOLIC BLOOD PRESSURE: 74 MMHG | BODY MASS INDEX: 22.93 KG/M2 | HEART RATE: 100 BPM | SYSTOLIC BLOOD PRESSURE: 115 MMHG

## 2024-05-24 DIAGNOSIS — Z3A.30 30 WEEKS GESTATION OF PREGNANCY: Primary | ICD-10-CM

## 2024-05-24 NOTE — PROGRESS NOTES
Kay Barclay presents today for routine OB visit at 30w4d.     Wt= ; There is no height or weight on file to calculate BMI.; TWG=5.08 kg (11 lb 3.2 oz)  /74 mmhg   ;  FHR  151   FH 28 cm  Abdomen: gravid, soft, non-tender.  She reports no concerns att his time .  Denies uterine contractions.  Denies vaginal bleeding or leaking of fluid.  Reports adequate fetal movement of at least 10 movements in 2 hours once daily.  Scheduled for ultrasound 5/31/24.  Reviewed premature labor precautions and fetal kick counts.  Advised to continue medications and return in 2 weeks.      Current Outpatient Medications   Medication Instructions    Prenatal Vit-Fe Fumarate-FA (Prenatal Plus Vitamin/Mineral) 27-1 MG TABS 1 tablet, Oral, Daily         G1 Problems (from 02/21/24 to present)       Problem Noted Resolved    Not immune to HBV 3/2/2024 by RANCHO Sanchez No    Overview Signed 3/2/2024 11:40 AM by RANCHO Sanchez     Needs HBV vaccine postpartum

## 2024-05-28 ENCOUNTER — TELEPHONE (OUTPATIENT)
Facility: HOSPITAL | Age: 19
End: 2024-05-28

## 2024-05-28 NOTE — TELEPHONE ENCOUNTER
Left voicemail informing patient of the change for her upcoming Maternal Fetal Medicine appointment on 5/31 to 5/29. Requested she give our office a call back at [phone] with any questions or if she would need to reschedule.

## 2024-05-29 ENCOUNTER — ULTRASOUND (OUTPATIENT)
Age: 19
End: 2024-05-29
Payer: COMMERCIAL

## 2024-05-29 VITALS
HEART RATE: 81 BPM | WEIGHT: 130.2 LBS | BODY MASS INDEX: 23.07 KG/M2 | HEIGHT: 63 IN | DIASTOLIC BLOOD PRESSURE: 72 MMHG | SYSTOLIC BLOOD PRESSURE: 118 MMHG

## 2024-05-29 DIAGNOSIS — Z3A.31 31 WEEKS GESTATION OF PREGNANCY: ICD-10-CM

## 2024-05-29 DIAGNOSIS — O26.843 UTERINE SIZE DATE DISCREPANCY PREGNANCY, THIRD TRIMESTER: ICD-10-CM

## 2024-05-29 DIAGNOSIS — Z36.4 ULTRASOUND FOR ANTENATAL SCREENING FOR FETAL GROWTH RESTRICTION: Primary | ICD-10-CM

## 2024-05-29 PROCEDURE — 76816 OB US FOLLOW-UP PER FETUS: CPT | Performed by: OBSTETRICS & GYNECOLOGY

## 2024-05-29 NOTE — LETTER
May 29, 2024     RANCHO Sanchez  [address] [place] [id number]    Patient: Kay Barclay   YOB: 2005   Date of Visit: 5/29/2024       Dear Dr. Jones:    Thank you for referring Kay Barclay to me for evaluation. Below are my notes for this consultation.    If you have questions, please do not hesitate to call me. I look forward to following your patient along with you.         Sincerely,        Marcellus Velazquez MD        CC: No Recipients    Marcellus Velazquez MD  5/29/2024  5:43 PM  Sign when Signing Visit  Please refer to the [facility] ultrasound report in Ob Procedures for additional information regarding today's visit

## 2024-05-29 NOTE — PROGRESS NOTES
Please refer to the [facility] ultrasound report in Ob Procedures for additional information regarding today's visit

## 2024-05-30 PROBLEM — Z3A.30 30 WEEKS GESTATION OF PREGNANCY: Status: ACTIVE | Noted: 2024-05-30

## 2024-06-06 ENCOUNTER — ROUTINE PRENATAL (OUTPATIENT)
Dept: OBGYN CLINIC | Facility: CLINIC | Age: 19
End: 2024-06-06

## 2024-06-06 VITALS
HEIGHT: 63 IN | BODY MASS INDEX: 23.39 KG/M2 | DIASTOLIC BLOOD PRESSURE: 69 MMHG | HEART RATE: 99 BPM | SYSTOLIC BLOOD PRESSURE: 114 MMHG | WEIGHT: 132 LBS

## 2024-06-06 DIAGNOSIS — Z34.93 PRENATAL CARE IN THIRD TRIMESTER: Primary | ICD-10-CM

## 2024-06-06 DIAGNOSIS — Z3A.32 32 WEEKS GESTATION OF PREGNANCY: ICD-10-CM

## 2024-06-06 PROCEDURE — 99213 OFFICE O/P EST LOW 20 MIN: CPT | Performed by: NURSE PRACTITIONER

## 2024-06-06 NOTE — PATIENT INSTRUCTIONS
Thank you for your confidence in our team.   We appreciate you and welcome your feedback.   If you receive a survey from us, please take a few moments to let us know how we are doing.   Sincerely,  RANCHO Sanchez       The Third Trimester  (28-42 weeks)  YOUR BABY   * your baby sucks its thumb now!   * your baby can hear voices and respond to touch…..so talk to him or her!!   * your baby’s brain grows and develops most in the last 2 months of pregnancy   * baby’s head and bones are soft and flexible so they can fit through the birth canal   * baby’s movements change towards the end of pregnancy because there is less room for kicking and stretching in your belly   * baby’s lungs are not fully developed and completely ready to breathe on their own until the last 3-4 weeks before your due date    YOUR BODY   * your belly is growing a lot now   * it may become more difficult to sleep well at night or to be as active as you usually are   * you may sweat more than usual   * you will become more off-balance……be careful not to fall!!   * you may develop hemorrhoids (which can be painful and make it difficult to sit down)   * the last two months of pregnancy can become very uncomfortable……with backaches, headaches, and heartburn   * you can start to have contractions…….as long as they are irregular and less than 5 per hour, this is a normal part of your body getting ready to have a baby   * your cervix may start to thin out and open up……to get ready for delivery   * you may find yourself needing to “pee” very often…….because baby is pressing on your bladder so much   * you may get out of breathe more quickly than usual      FETAL KICK COUNTS    In the third trimester (after 28 weeks gestation) you should be performing fetal kick counts every day.  Your baby should move at least 10 times in 2 hours during an active time, once a day.    Choose atime of day when your baby is most active.  Try to do this around the  same time each day.  Get into a comfortable position and then write down the time your baby first moves.  Count each movement until the baby moves 10 times.  These movements include kicks, punches, nudges, flutters, or rolls.  This can take anywhere from 5 minutes to 2 hours.  Write down the time you feel the baby's 10th movement.    If 2 hours has passed and your baby has not moved at least 10 times, you should CALL THE OFFICE RIGHT AWAY.  [phone].          PREMATURE LABOR     When to call [phone]:  * I need to call immediately if I have even a small amount of LIQUID leaking from my vagina, with or without contractions.   * I need to call if I am BLEEDING from my vagina.   * I need to call if I am feeling CRAMPING that continues after drinking 2-3 glasses of water and lying down on my side for one hour and that feels like I am having a period.   * I need to call if I feel CONTRACTIONS  more than 4 times in an hour that feels like the baby is “balling up” even after I try drinking 2-3 glasses of water and lying down on my side for an hour.   * I need to call if I notice a change in my vaginal DISCHARGE.   * I need to call if I am feeling PELVIC PRESSURE  that feels like the baby is pushing down into my vagina and lasts more than an hour.   * I need to call if I have LOW BACKACHE which is new and near my tailbone.  It may either come and go several times during an hour or stay there constantly.          PRE-ECLAMPSIA     What is it?   Pre-eclampsia is a serious disease that can occur during pregnancy related to high blood pressures.  It can happen to any woman.     Why should I care?   Women who develop pre-eclampsia have serious risks which can include seizures, stroke, organ damage, premature birth of their baby.  In the very worst cases, it can cause death of the mother and/or their baby.     What should I pay attention to?   Signs and symptoms of pre-eclampsia can include:   * Severe swelling of face or  hands    * A headache that will not go away even after you have taken Tylenol   * Seeing spots or changes in eyesight    * Pain in the upper abdomen or shoulder    * New nausea and vomiting (in the second half of pregnancy)    * Sudden weight gain    * Difficulty breathing     What should I do?   If you experience any of the above symptoms of pre-eclampsia, contact your OB provider.  Finding pre-eclampsia early is important for you and your baby.  Call us at [phone]..      BREASTFEEDING     BENEFITS FOR BABIES   * stronger immune systems (less allergies, eczema, asthma, and childhood cancers)   * less diarrhea and constipation or other GI diseases   * fewer colds and ear infections   * better vision and teeth (fewer cavities)   * improves IQ   * lower rates of diabetes and obesity in childhood     BENEFITS FOR MOMS   * promotes faster weight loss after delivery   * lower risk for postpartum depression   * lower risk for breast, uterine, and ovarian cancers   * lower risk for osteoporosis developing with age   * easier than formula - is always right with you, clean, and the right temperature   * less expensive than formula……it’s FREE !!!!     KEYS TO SUCCESSFUL BREASTFEEDING   * keep baby skin-to-skin until after first feeding event   * keep baby in your room with you during your hospital stay after delivery   * avoid any bottle feedings (unless medically necessary)   * limit the use of pacifiers and swaddling   * ask for help if you are having any issues……lactation consultants (who specialize in breastfeeding) are available to help you   * a healthy diet for mom……eating a variety of foods and portions in moderation    THINGS YOU SHOULD KNOW ABOUT BREASTFEEDING   * most medications are considered compatible with breastfeeding by the American Academy of Pediatrics, but you should check with your health care provider or lactation consultant prior to taking a new medication……just to be sure it is safe   * alcohol  (beer, wine, liquor) can be passed from mother to baby through breast milk……an occasional, social drink is deemed acceptable by the American Academy of Pediatrics…..more than that should be avoided   * breastfeeding is NOT an effective method of birth control   * nicotine (in cigarettes) can pass from mother to baby through breast milk…..however, for mothers who smoke, it is still healthier to breastfeed than use formula   * caffeine should be limited to 1-3 cups per day……includes coffee, soda, energy drinks         PERINEAL / VAGINAL MASSAGE    What can I do now to decrease my chances of tearing during delivery?  Massaging around the vaginal opening by you (or your partner), either antepartum (before birth) or during the second stage of labor, can reduce the likelihood of perineal tearing during childbirth.  Likewise, the use of warm packs held on the perineum during the pushing stage of labor can reduce the severity of your tear.  This will happen during the pushing stage of labor.  At home, you can also help reduce the chances of injury that may occur during the birth of your child through perineal massage.    When should I do this?  Starting around or shortly after 34 weeks of pregnancy, you or your partner should provide 5-10 minutes of vaginal massage 1-4 times per week.    How?  Use either almond, coconut, or olive oil and water mixture on 1 or 2 fingers (depending on comfort).  Insert finger(s) 3-5cm into the vagina.  Apply sweeping downward/sideward pressure from 3 to 9 o'clock for 5-10 minutes, 1-4 times per week.          WARNING SIGNS DURING PREGNANCY  Call our office at [phone] if you experience any of the followin. Vaginal bleeding  2. Sharp abdominal pain that does not go away  3. Fever (more than 100.4 and is not relieved by Tylenol)  4. Persistent vomiting lasting greater than 24 hours  5. Chest pain   6. Pain or burning when you urinate  7. Severe headache that doesn't resolve with  Tylenol  8. Blurred vision or seeing spots in your vision  9. Sudden swelling of your face or hands  10. Redness, swelling or pain in a leg  11. A sudden weight gain in just a few days  12. Decrease in your baby's movement (after 28 weeks or the 6th month of pregnancy)  13. A loss of watery fluid from your vagina - can be a gush, a trickle or continuous wetness  14. After 20 weeks of pregnancy, rhythmic cramping (greater than 4 per hour) or menstrual like low/pelvic pain          VACCINES IN PREGNANCY    TDAP  Whooping cough (or pertussis) can be serious for anyone, but for your , it can be life-threatning.  Up to 20 babies die each year in the [place] Due to whooping cough.  About half of babies younger than 1 year old who get whooping cough need treatment in the hospital.  The younger the baby is when he or she gets whooping cough, the more likely he or she will need to be treated in a hospital.  When you receive the whooping cough vaccine (Tdap) during your pregnancy, your body will create protective antibodies and pass some of them to your baby before birth.  These antibodies can help protect your baby from getting whooping cough until they are old enough to be vaccinated themselves (usually around 6 months of age).    INFLUENZA  Changes in your immune, heart, and lung functions during pregnancy make you more likely to get seriously ill from the flu.  Catching the flu also increases your chances for serious problems for your developing baby, including premature labor and delivery.  It is recommended that all women who are pregnant during flu season should receive an influenza vaccine.

## 2024-06-21 ENCOUNTER — ROUTINE PRENATAL (OUTPATIENT)
Dept: OBGYN CLINIC | Facility: CLINIC | Age: 19
End: 2024-06-21

## 2024-06-21 VITALS
SYSTOLIC BLOOD PRESSURE: 116 MMHG | WEIGHT: 131.6 LBS | HEART RATE: 86 BPM | BODY MASS INDEX: 23.32 KG/M2 | HEIGHT: 63 IN | DIASTOLIC BLOOD PRESSURE: 75 MMHG

## 2024-06-21 DIAGNOSIS — Z34.93 PRENATAL CARE IN THIRD TRIMESTER: Primary | ICD-10-CM

## 2024-06-21 DIAGNOSIS — Z3A.34 34 WEEKS GESTATION OF PREGNANCY: ICD-10-CM

## 2024-06-21 PROCEDURE — 99213 OFFICE O/P EST LOW 20 MIN: CPT | Performed by: NURSE PRACTITIONER

## 2024-06-21 NOTE — PROGRESS NOTES
Kay Barclay presents today for routine OB visit at 34w4d.  Blood Pressure: 116/75  Wt=59.7 kg (131 lb 9.6 oz); Body mass index is 23.31 kg/m².; TWG=7.53 kg (16 lb 9.6 oz)  Fetal Heart Rate: 138; Fundal Height (cm): 32 cm  Abdomen: gravid, soft, non-tender.  She reports no complaints.  Reports occasional mild uterine contractions.  Denies vaginal bleeding or leaking of fluid.  Reports adequate fetal movement of at least 10 movements in 2 hours once daily.  Reviewed premature labor precautions and fetal kick counts.  Advised to continue medications and return in 2 weeks.      Current Outpatient Medications   Medication Instructions    Prenatal Vit-Fe Fumarate-FA (Prenatal Plus Vitamin/Mineral) 27-1 MG TABS 1 tablet, Oral, Daily       Laboratory workup: initial OB labs (done 3/1/24); 28 week labs (done 5/3/24)    Genetic Screening: desires NIPS, MSAFP negative    Vaccinations: influenza (declined 2/28/24); Tdap (given 5/8/24); RSV (not indicated outside RSV season); COVID-19 (declined 3/27/24)    Postpartum contraception: desires Nexplanon    Fetal Ultrasounds:  3/8/24 (19w4d) EDC confirmed, no previa, kia WNL & complete  5/29/24 (31w2d) EFW=35%, AC=23%, ARNAUD=17.6cm, vertex      G1 Problems (from 02/21/24 to present)       Problem Noted Resolved    Not immune to HBV 3/2/2024 by RANCHO Sanchez No    Overview Addendum 6/6/2024 10:21 AM by RANCHO Sanchez     Needs HBV vaccine booster postpartum

## 2024-07-05 ENCOUNTER — ROUTINE PRENATAL (OUTPATIENT)
Dept: OBGYN CLINIC | Facility: CLINIC | Age: 19
End: 2024-07-05

## 2024-07-05 VITALS
WEIGHT: 134.8 LBS | DIASTOLIC BLOOD PRESSURE: 79 MMHG | BODY MASS INDEX: 23.88 KG/M2 | HEART RATE: 96 BPM | SYSTOLIC BLOOD PRESSURE: 120 MMHG | HEIGHT: 63 IN

## 2024-07-05 DIAGNOSIS — Z3A.36 36 WEEKS GESTATION OF PREGNANCY: ICD-10-CM

## 2024-07-05 DIAGNOSIS — Z34.93 PRENATAL CARE IN THIRD TRIMESTER: Primary | ICD-10-CM

## 2024-07-05 PROCEDURE — 87491 CHLMYD TRACH DNA AMP PROBE: CPT | Performed by: NURSE PRACTITIONER

## 2024-07-05 PROCEDURE — 87150 DNA/RNA AMPLIFIED PROBE: CPT | Performed by: NURSE PRACTITIONER

## 2024-07-05 PROCEDURE — 99215 OFFICE O/P EST HI 40 MIN: CPT | Performed by: NURSE PRACTITIONER

## 2024-07-05 PROCEDURE — 87591 N.GONORRHOEAE DNA AMP PROB: CPT | Performed by: NURSE PRACTITIONER

## 2024-07-05 NOTE — PROGRESS NOTES
"Assessment & Plan  19 y.o.  at 36w4d presenting for routine prenatal visit.   GBS and GC/Chlamydia collected today    Problem List Items Addressed This Visit          Obstetrics/Gynecology    36 weeks gestation of pregnancy    Relevant Orders    Strep B DNA probe, amplification    Chlamydia/GC amplified DNA by PCR     Other Visit Diagnoses       Prenatal care in third trimester    -  Primary    Relevant Orders    Strep B DNA probe, amplification    Chlamydia/GC amplified DNA by PCR          ____________________________________________________________  Subjective  She is without complaint.   She denies contractions, loss of fluid, or vaginal bleeding.   She feels regular fetal movements.   FHt 150  WNL normal respiratory effort, negative cough oe SOB    Objective  /79 (BP Location: Left arm, Patient Position: Sitting, Cuff Size: Standard)   Pulse 96   Ht 5' 3\" (1.6 m)   Wt 61.1 kg (134 lb 12.8 oz)   LMP  (LMP Unknown)   BMI 23.88 kg/m²          Patient's Active Problem List  Patient Active Problem List   Diagnosis    Not immune to HBV    36 weeks gestation of pregnancy       Plan  To call with vaginal bleeding, loss of fluid, regular contractions, decreased fetal movement, other needs or concerns.  Return in 1 week  Pt verbalized understanding of all discussed.      "

## 2024-07-05 NOTE — PATIENT INSTRUCTIONS
LABOR PRECAUTIONS  Call our office at [phone] for any of the following:    * I need to call immediately I if I have even a small amount of LIQUID  leaking from my vagina, with or without contractions.   * I need to call if I am BLEEDING an amount equal to or more than a period.  A small amount of bloody vaginal discharge is normal at the end of the pregnancy.   * I need to call if I am having CONTRACTIONS  every five minutes for at least an hour.  I will need a watch in order to time them.  I should time them from the beginning of one contraction until the beginning of the next one.   * I need to call BEFORE  I go to the hospital.   * I need to have a plan for TRANSPORTATION  to get to the hospital when I am in labor.  Labor is generally not an emergency which requires an ambulance.          FETAL KICK COUNTS    In the third trimester (after 28 weeks gestation) you should be performing fetal kick counts every day.  Your baby should move at least 10 times in 2 hours during an active time, once a day.    Choose atime of day when your baby is most active.  Try to do this around the same time each day.  Get into a comfortable position and then write down the time your baby first moves.  Count each movement until the baby moves 10 times.  These movements include kicks, punches, nudges, flutters, or rolls.  This can take anywhere from 5 minutes to 2 hours.  Write down the time you feel the baby's 10th movement.    If 2 hours has passed and your baby has not moved at least 10 times, you should CALL THE OFFICE RIGHT AWAY.  [phone]        PERINEAL / VAGINAL MASSAGE    What can I do now to decrease my chances of tearing during delivery?  Massaging around the vaginal opening by you (or your partner), either antepartum (before birth) or during the second stage of labor, can reduce the likelihood of perineal tearing during childbirth.  Likewise, the use of warm packs held on the perineum during the pushing stage of  labor can reduce the severity of your tear.  This will happen during the pushing stage of labor.  At home, you can also help reduce the chances of injury that may occur during the birth of your child through perineal massage.    When should I do this?  Starting around or shortly after 34 weeks of pregnancy, you or your partner should provide 5-10 minutes of vaginal massage 1-4 times per week.    How?  Use either almond, coconut, or olive oil and water mixture on 1 or 2 fingers (depending on comfort).  Insert finger(s) 3-5cm into the vagina.  Apply sweeping downward/sideward pressure from 3 to 9 o'clock for 5-10 minutes, 1-4 times per week.        GROUP B STREP    Group B Strep (GBS) is a common vaginal bacteria.  Approximately 25% of women normally have GBS bacteria present in the vagina.  It is NOT a sexually-transmitted infection.  In fact, it is not an infection AT ALL!  It is just a normal vaginal bacteria for many women.    However, the GBS bacteria can be dangerous to a  baby if the baby is exposed to that particular bacteria during labor and birth AND develops an infection from it.  The likelihood of a  GBS infection for a woman who has GBS bacteria in the vagina is about 1%-2%.  But if it does occur, a baby could become severely ill.    For this reason, we do a vaginal culture (Q-tip swab of the vagina and rectum) for ALL pregnant women at approximately 36 weeks of pregnancy.  If the culture shows that there is GBS bacteria present, it is NOT a reason to panic!  Because in this situation we will give this woman antibiotics through her IV while she is in labor.  When a mother is treated with antibiotics during labor and delivery, her baby ALMOST NEVER becomes infected with GBS bacteria.      Return in 1 week

## 2024-07-06 LAB
C TRACH DNA SPEC QL NAA+PROBE: NEGATIVE
N GONORRHOEA DNA SPEC QL NAA+PROBE: NEGATIVE

## 2024-07-08 LAB — GP B STREP DNA SPEC QL NAA+PROBE: NEGATIVE

## 2024-07-10 ENCOUNTER — ROUTINE PRENATAL (OUTPATIENT)
Dept: OBGYN CLINIC | Facility: CLINIC | Age: 19
End: 2024-07-10

## 2024-07-10 VITALS
HEART RATE: 82 BPM | HEIGHT: 63 IN | DIASTOLIC BLOOD PRESSURE: 78 MMHG | BODY MASS INDEX: 24.2 KG/M2 | SYSTOLIC BLOOD PRESSURE: 125 MMHG | WEIGHT: 136.6 LBS

## 2024-07-10 DIAGNOSIS — Z34.03 ENCOUNTER FOR SUPERVISION OF NORMAL FIRST PREGNANCY IN THIRD TRIMESTER: Primary | ICD-10-CM

## 2024-07-10 PROBLEM — Z3A.37 37 WEEKS GESTATION OF PREGNANCY: Status: ACTIVE | Noted: 2024-03-27

## 2024-07-10 PROCEDURE — 99213 OFFICE O/P EST LOW 20 MIN: CPT | Performed by: OBSTETRICS & GYNECOLOGY

## 2024-07-17 ENCOUNTER — ROUTINE PRENATAL (OUTPATIENT)
Dept: OBGYN CLINIC | Facility: CLINIC | Age: 19
End: 2024-07-17

## 2024-07-17 VITALS
BODY MASS INDEX: 24.17 KG/M2 | DIASTOLIC BLOOD PRESSURE: 80 MMHG | SYSTOLIC BLOOD PRESSURE: 131 MMHG | WEIGHT: 136.4 LBS | HEIGHT: 63 IN | HEART RATE: 101 BPM

## 2024-07-17 DIAGNOSIS — Z34.03 ENCOUNTER FOR SUPERVISION OF NORMAL FIRST PREGNANCY IN THIRD TRIMESTER: Primary | ICD-10-CM

## 2024-07-17 PROBLEM — Z3A.38 38 WEEKS GESTATION OF PREGNANCY: Status: ACTIVE | Noted: 2024-03-27

## 2024-07-17 PROCEDURE — 99213 OFFICE O/P EST LOW 20 MIN: CPT | Performed by: OBSTETRICS & GYNECOLOGY

## 2024-07-17 NOTE — PROGRESS NOTES
"Assessment & Plan  19 y.o.  at 38w2d presenting for routine prenatal visit.       Problem List       Not immune to HBV    Overview     Needs HBV vaccine booster postpartum         38 weeks gestation of pregnancy    Overview     Prenatal labs wnl  [X]MSAFP negative  [X] TDAP vaccine   [X]Delivery consent  [ ]Contraception  [X] GBS neg  Would like to await spontaneous labor              Other Visit Diagnoses       Encounter for supervision of normal first pregnancy in third trimester    -  Primary          ____________________________________________________________  Subjective  She is without complaint.   She admits to contractions the last few days but she did not go to the hospital.   She feels regular fetal movements.       Objective  /80 (BP Location: Right arm, Patient Position: Sitting)   Pulse 101   Ht 5' 3\" (1.6 m)   Wt 61.9 kg (136 lb 6.4 oz)   LMP  (LMP Unknown)   BMI 24.16 kg/m²   FHR: 140's via doppler    Physical Exam  Constitutional:       Appearance: She is well-developed.   Cardiovascular:      Rate and Rhythm: Normal rate and regular rhythm.      Heart sounds: Normal heart sounds. No murmur heard.     No friction rub. No gallop.   Pulmonary:      Effort: Pulmonary effort is normal. No respiratory distress.      Breath sounds: No wheezing.   Abdominal:      Palpations: Abdomen is soft.      Tenderness: There is no abdominal tenderness.   Musculoskeletal:         General: No tenderness.   Neurological:      Mental Status: She is alert and oriented to person, place, and time.   Vitals reviewed.          Patient's Active Problem List  Patient Active Problem List   Diagnosis    Not immune to HBV    38 weeks gestation of pregnancy           Tanisha Rai MD  2024  1:38 PM          "

## 2024-07-25 ENCOUNTER — HOSPITAL ENCOUNTER (INPATIENT)
Facility: HOSPITAL | Age: 19
LOS: 2 days | Discharge: HOME | DRG: 560 | End: 2024-07-27
Attending: OBSTETRICS & GYNECOLOGY | Admitting: OBSTETRICS & GYNECOLOGY
Payer: COMMERCIAL

## 2024-07-25 ENCOUNTER — ROUTINE PRENATAL (OUTPATIENT)
Dept: OBGYN CLINIC | Facility: CLINIC | Age: 19
End: 2024-07-25

## 2024-07-25 VITALS
BODY MASS INDEX: 24.27 KG/M2 | HEIGHT: 63 IN | WEIGHT: 137 LBS | DIASTOLIC BLOOD PRESSURE: 76 MMHG | SYSTOLIC BLOOD PRESSURE: 128 MMHG | HEART RATE: 86 BPM

## 2024-07-25 DIAGNOSIS — Z34.93 PRENATAL CARE IN THIRD TRIMESTER: Primary | ICD-10-CM

## 2024-07-25 DIAGNOSIS — Z3A.39 39 WEEKS GESTATION OF PREGNANCY: ICD-10-CM

## 2024-07-25 DIAGNOSIS — Z3A.39 39 WEEKS GESTATION OF PREGNANCY: Primary | ICD-10-CM

## 2024-07-25 LAB
ABO GROUP BLD: NORMAL
BASE EXCESS BLDCOA CALC-SCNC: -4.5 MMOL/L (ref 3–11)
BASE EXCESS BLDCOV CALC-SCNC: -4.8 MMOL/L (ref 1–9)
BLD GP AB SCN SERPL QL: NEGATIVE
ERYTHROCYTE [DISTWIDTH] IN BLOOD BY AUTOMATED COUNT: 11.9 % (ref 11.6–15.1)
HCO3 BLDCOA-SCNC: 24.7 MMOL/L (ref 17.3–27.3)
HCO3 BLDCOV-SCNC: 20.9 MMOL/L (ref 12.2–28.6)
HCT VFR BLD AUTO: 41.2 % (ref 34.8–46.1)
HGB BLD-MCNC: 14 G/DL (ref 11.5–15.4)
MCH RBC QN AUTO: 30.7 PG (ref 26.8–34.3)
MCHC RBC AUTO-ENTMCNC: 34 G/DL (ref 31.4–37.4)
MCV RBC AUTO: 90 FL (ref 82–98)
O2 CT VFR BLDCOA CALC: 4.3 ML/DL
OXYHGB MFR BLDCOA: 18.2 %
OXYHGB MFR BLDCOV: 77.7 %
PCO2 BLDCOA: 62.4 MM[HG] (ref 30–60)
PCO2 BLDCOV: 41.3 MM HG (ref 27–43)
PH BLDCOA: 7.22 [PH] (ref 7.23–7.43)
PH BLDCOV: 7.32 [PH] (ref 7.19–7.49)
PLATELET # BLD AUTO: 243 THOUSANDS/UL (ref 149–390)
PMV BLD AUTO: 10 FL (ref 8.9–12.7)
PO2 BLDCOA: 13.2 MM HG (ref 5–25)
PO2 BLDCOV: 38.1 MM HG (ref 15–45)
RBC # BLD AUTO: 4.56 MILLION/UL (ref 3.81–5.12)
RH BLD: POSITIVE
SAO2 % BLDCOV: 18.8 ML/DL
SPECIMEN EXPIRATION DATE: NORMAL
WBC # BLD AUTO: 13.91 THOUSAND/UL (ref 4.31–10.16)

## 2024-07-25 PROCEDURE — 0HQ9XZZ REPAIR PERINEUM SKIN, EXTERNAL APPROACH: ICD-10-PCS | Performed by: OBSTETRICS & GYNECOLOGY

## 2024-07-25 PROCEDURE — 85027 COMPLETE CBC AUTOMATED: CPT

## 2024-07-25 PROCEDURE — 86780 TREPONEMA PALLIDUM: CPT

## 2024-07-25 PROCEDURE — 59409 OBSTETRICAL CARE: CPT | Performed by: OBSTETRICS & GYNECOLOGY

## 2024-07-25 PROCEDURE — 86900 BLOOD TYPING SEROLOGIC ABO: CPT

## 2024-07-25 PROCEDURE — 99202 OFFICE O/P NEW SF 15 MIN: CPT

## 2024-07-25 PROCEDURE — 4A1HXCZ MONITORING OF PRODUCTS OF CONCEPTION, CARDIAC RATE, EXTERNAL APPROACH: ICD-10-PCS | Performed by: OBSTETRICS & GYNECOLOGY

## 2024-07-25 PROCEDURE — 86850 RBC ANTIBODY SCREEN: CPT

## 2024-07-25 PROCEDURE — 99213 OFFICE O/P EST LOW 20 MIN: CPT | Performed by: NURSE PRACTITIONER

## 2024-07-25 PROCEDURE — 86901 BLOOD TYPING SEROLOGIC RH(D): CPT

## 2024-07-25 PROCEDURE — 82805 BLOOD GASES W/O2 SATURATION: CPT | Performed by: OBSTETRICS & GYNECOLOGY

## 2024-07-25 PROCEDURE — NC001 PR NO CHARGE: Performed by: OBSTETRICS & GYNECOLOGY

## 2024-07-25 RX ORDER — IBUPROFEN 600 MG/1
600 TABLET ORAL EVERY 6 HOURS
Status: DISCONTINUED | OUTPATIENT
Start: 2024-07-25 | End: 2024-07-27 | Stop reason: HOSPADM

## 2024-07-25 RX ORDER — CALCIUM CARBONATE 500 MG/1
1000 TABLET, CHEWABLE ORAL DAILY PRN
Status: DISCONTINUED | OUTPATIENT
Start: 2024-07-25 | End: 2024-07-27 | Stop reason: HOSPADM

## 2024-07-25 RX ORDER — BUPIVACAINE HYDROCHLORIDE 2.5 MG/ML
30 INJECTION, SOLUTION EPIDURAL; INFILTRATION; INTRACAUDAL ONCE AS NEEDED
Status: DISCONTINUED | OUTPATIENT
Start: 2024-07-25 | End: 2024-07-25

## 2024-07-25 RX ORDER — SODIUM CHLORIDE, SODIUM LACTATE, POTASSIUM CHLORIDE, CALCIUM CHLORIDE 600; 310; 30; 20 MG/100ML; MG/100ML; MG/100ML; MG/100ML
125 INJECTION, SOLUTION INTRAVENOUS CONTINUOUS
Status: DISCONTINUED | OUTPATIENT
Start: 2024-07-25 | End: 2024-07-27 | Stop reason: HOSPADM

## 2024-07-25 RX ORDER — ONDANSETRON 2 MG/ML
4 INJECTION INTRAMUSCULAR; INTRAVENOUS EVERY 6 HOURS PRN
Status: DISCONTINUED | OUTPATIENT
Start: 2024-07-25 | End: 2024-07-25

## 2024-07-25 RX ORDER — ACETAMINOPHEN 325 MG/1
650 TABLET ORAL EVERY 4 HOURS PRN
Status: DISCONTINUED | OUTPATIENT
Start: 2024-07-25 | End: 2024-07-27 | Stop reason: HOSPADM

## 2024-07-25 RX ORDER — BENZOCAINE/MENTHOL 6 MG-10 MG
1 LOZENGE MUCOUS MEMBRANE DAILY PRN
Status: DISCONTINUED | OUTPATIENT
Start: 2024-07-25 | End: 2024-07-27 | Stop reason: HOSPADM

## 2024-07-25 RX ORDER — SENNOSIDES 8.6 MG
1 TABLET ORAL DAILY
Status: DISCONTINUED | OUTPATIENT
Start: 2024-07-26 | End: 2024-07-27 | Stop reason: HOSPADM

## 2024-07-25 RX ORDER — SIMETHICONE 80 MG
80 TABLET,CHEWABLE ORAL 4 TIMES DAILY PRN
Status: DISCONTINUED | OUTPATIENT
Start: 2024-07-25 | End: 2024-07-27 | Stop reason: HOSPADM

## 2024-07-25 RX ORDER — OXYTOCIN/RINGER'S LACTATE 30/500 ML
250 PLASTIC BAG, INJECTION (ML) INTRAVENOUS ONCE
Status: COMPLETED | OUTPATIENT
Start: 2024-07-25 | End: 2024-07-26

## 2024-07-25 RX ORDER — OXYTOCIN/RINGER'S LACTATE 30/500 ML
PLASTIC BAG, INJECTION (ML) INTRAVENOUS
Status: COMPLETED
Start: 2024-07-25 | End: 2024-07-25

## 2024-07-25 RX ORDER — ONDANSETRON 2 MG/ML
4 INJECTION INTRAMUSCULAR; INTRAVENOUS EVERY 8 HOURS PRN
Status: DISCONTINUED | OUTPATIENT
Start: 2024-07-25 | End: 2024-07-27 | Stop reason: HOSPADM

## 2024-07-25 RX ADMIN — Medication 250 MILLI-UNITS/MIN: at 22:44

## 2024-07-25 RX ADMIN — BUPIVACAINE HYDROCHLORIDE 30 ML: 2.5 INJECTION, SOLUTION EPIDURAL; INFILTRATION; INTRACAUDAL; PERINEURAL at 22:40

## 2024-07-25 NOTE — PATIENT INSTRUCTIONS
Thank you for your confidence in our team.   We appreciate you and welcome your feedback.   If you receive a survey from us, please take a few moments to let us know how we are doing.   Sincerely,  RANCHO Sanchez       LABOR PRECAUTIONS  Call our office at [phone] for any of the following:    * I need to call immediately I if I have even a small amount of LIQUID  leaking from my vagina, with or without contractions.   * I need to call if I am BLEEDING an amount equal to or more than a period.  A small amount of bloody vaginal discharge is normal at the end of the pregnancy.   * I need to call if I am having CONTRACTIONS  every five minutes for at least an hour.  I will need a watch in order to time them.  I should time them from the beginning of one contraction until the beginning of the next one.   * I need to call BEFORE  I go to the hospital.   * I need to have a plan for TRANSPORTATION  to get to the hospital when I am in labor.  Labor is generally not an emergency which requires an ambulance.          FETAL KICK COUNTS    In the third trimester (after 28 weeks gestation) you should be performing fetal kick counts every day.  Your baby should move at least 10 times in 2 hours during an active time, once a day.    Choose atime of day when your baby is most active.  Try to do this around the same time each day.  Get into a comfortable position and then write down the time your baby first moves.  Count each movement until the baby moves 10 times.  These movements include kicks, punches, nudges, flutters, or rolls.  This can take anywhere from 5 minutes to 2 hours.  Write down the time you feel the baby's 10th movement.    If 2 hours has passed and your baby has not moved at least 10 times, you should CALL THE OFFICE RIGHT AWAY.  [phone]        PERINEAL / VAGINAL MASSAGE    What can I do now to decrease my chances of tearing during delivery?  Massaging around the vaginal opening by you (or your  partner), either antepartum (before birth) or during the second stage of labor, can reduce the likelihood of perineal tearing during childbirth.  Likewise, the use of warm packs held on the perineum during the pushing stage of labor can reduce the severity of your tear.  This will happen during the pushing stage of labor.  At home, you can also help reduce the chances of injury that may occur during the birth of your child through perineal massage.    When should I do this?  Starting around or shortly after 34 weeks of pregnancy, you or your partner should provide 5-10 minutes of vaginal massage 1-4 times per week.    How?  Use either almond, coconut, or olive oil and water mixture on 1 or 2 fingers (depending on comfort).  Insert finger(s) 3-5cm into the vagina.  Apply sweeping downward/sideward pressure from 3 to 9 o'clock for 5-10 minutes, 1-4 times per week.

## 2024-07-25 NOTE — PROGRESS NOTES
Kay Barclay presents today for routine OB visit at 39w3d.  Blood Pressure: 128/76  Wt=62.1 kg (137 lb); Body mass index is 24.27 kg/m².; TWG=9.979 kg (22 lb)  Fetal Heart Rate: 145; Fundal Height (cm): 38 cm  Abdomen: gravid, soft, non-tender.  She reports pelvic pressure.  Reports irregular mild uterine contractions.  Denies vaginal bleeding or leaking of fluid.  Reports adequate fetal movement of at least 10 movements in 2 hours once daily.  Reviewed labor precautions and fetal kick counts as well as pre-eclampsia warning signs.  Reviewed perineal massage for decreasing risk of perineal lacerations during delivery.  Advised to continue medications and return in 1 week.      Current Outpatient Medications   Medication Instructions    Prenatal Vit-Fe Fumarate-FA (Prenatal Plus Vitamin/Mineral) 27-1 MG TABS 1 tablet, Oral, Daily       Laboratory workup: initial OB labs (done 3/1/24); 28 week labs (done 5/3/24); 36 week cultures (done 7/5/24)    Genetic Screening: desires NIPS, MSAFP negative    Vaccinations: influenza (declined 2/28/24); Tdap (given 5/8/24); RSV (not indicated outside RSV season); COVID-19 (declined 3/27/24)    Postpartum contraception: desires Nexplanon    Fetal Ultrasounds:  3/8/24 (19w4d) EDC confirmed, no previa, kia WNL & complete  5/29/24 (31w2d) EFW=35%, AC=23%, ARNAUD=17.6cm, vertex      G1 Problems (from 02/21/24 to present)       Problem Noted Resolved    Not immune to HBV 3/2/2024 by RANCHO Sanchez No    Overview Addendum 6/6/2024 10:21 AM by RANCHO Sanchez     Needs HBV vaccine booster postpartum

## 2024-07-26 PROCEDURE — NC001 PR NO CHARGE: Performed by: NURSE PRACTITIONER

## 2024-07-26 PROCEDURE — 99024 POSTOP FOLLOW-UP VISIT: CPT | Performed by: STUDENT IN AN ORGANIZED HEALTH CARE EDUCATION/TRAINING PROGRAM

## 2024-07-26 RX ADMIN — IBUPROFEN 600 MG: 600 TABLET, FILM COATED ORAL at 12:55

## 2024-07-26 RX ADMIN — IBUPROFEN 600 MG: 600 TABLET, FILM COATED ORAL at 06:37

## 2024-07-26 RX ADMIN — WITCH HAZEL 1 PAD: 500 SOLUTION RECTAL; TOPICAL at 01:04

## 2024-07-26 RX ADMIN — SENNOSIDES 8.6 MG: 8.6 TABLET, FILM COATED ORAL at 09:19

## 2024-07-26 RX ADMIN — BENZOCAINE AND LEVOMENTHOL 1 APPLICATION: 200; 5 SPRAY TOPICAL at 01:04

## 2024-07-26 RX ADMIN — IBUPROFEN 600 MG: 600 TABLET, FILM COATED ORAL at 01:03

## 2024-07-26 NOTE — NURSING NOTE
Save your life magnet & d/c handouts given to and explained to pt. Pt. Will f/u with any questions or concerns.

## 2024-07-26 NOTE — PLAN OF CARE
Problem: POSTPARTUM  Goal: Experiences normal postpartum course  Description: INTERVENTIONS:  - Monitor maternal vital signs  - Assess uterine involution and lochia  Outcome: Progressing     Problem: POSTPARTUM  Goal: Establishment of infant feeding pattern  Description: INTERVENTIONS:  - Assess breast/bottle feeding  - Refer to lactation as needed  Outcome: Progressing

## 2024-07-26 NOTE — ASSESSMENT & PLAN NOTE
Recovering well   Routine postpartum care, encourage ambulation, encourage familial bonding  Lactation support for breast/bottle feeding as needed  FEN: Tolerating regular diet  Pain: Motrin/Tylenol around the clock  Appropriate bowel and bladder function   Postpartum Contraceptive plan: Nexplanon in office PP

## 2024-07-26 NOTE — H&P
H & P- Obstetrics   Kay Barclay 19 y.o. female MRN: [id number]  Unit/Bed#:  307-01 Encounter: [id number]      Assessment/Plan:    Kay cedillo a 19 y.o.  at 39w3d admitted for active labor    SVE: Cervical Dilation: 10  Dilation Complete Date: 24  Dilation Complete Time: 2214  Cervical Effacement: 100  Fetal Station: 3    Active labor  Admit to OBGYN   Clear liquid diet   F/u T&S, CBC, RPR   IVF LR 125cc/hr   Continuous fetal monitoring and tocometry   Analgesia at maternal request   Vertex by TAUS  GBS negative  Delivery    Patient of: [facility]  This patient will be an INPATIENT  and I certify the anticipated length of stay is >2 Midnights  Discussed with Dr. Ferro      SUBJECTIVE:    Chief Complaint: Painful contractions, leakage of fluid    HPI: Kay Barclay is a 19 y.o.  with an BENJAMIN of 2024, by Ultrasound at 39w3d who is being admitted for labor . She complains of uterine contractions, occurring every 3-5 minutes, has large amounts of fluid leaking, and reports no VB. She states she has felt good FM. This pregnancy is uncomplicated. All other review of systems is negative.       Pregnancy Plan:  Pregnancy: [place]  Fetal sex: Female     Delivery Plans  Deliver by GA (weeks): 42  Planned delivery method: Vaginal  Planned delivery location: [facility]  Planned anesthesia: Epidural  Acceptable blood products: All     Post-Delivery Plans  Feeding intentions: Breast Milk and Non-human milk substitute  Planned birth control: Implant      Patient Active Problem List   Diagnosis    Not immune to HBV    39 weeks gestation of pregnancy       OB History    Para Term  AB Living   1 0 0 0 0 0   SAB IAB Ectopic Multiple Live Births   0 0 0 0 0      # Outcome Date GA Lbr Jerrod/2nd Weight Sex Type Anes PTL Lv   1 Current                Past Medical History:   Diagnosis Date    Anxiety     Depression     Scoliosis        Past Surgical History:    Procedure Laterality Date    NO PAST SURGERIES         Social History     Tobacco Use    Smoking status: Never    Smokeless tobacco: Never   Substance Use Topics    Alcohol use: Never       No Known Allergies      Medications Prior to Admission:     Prenatal Vit-Fe Fumarate-FA (Prenatal Plus Vitamin/Mineral) 27-1 MG TABS        OBJECTIVE:  Vitals:  HR:  [86] 86  BP: (114-128)/(75-76) 114/75  There is no height or weight on file to calculate BMI.     Physical Exam:  General: Very uncomfortable breathing through contractions  Respiratory: Labored breathing through contractions  Cardiovascular: Regular rate  Abdomen: Soft, gravid, nontender  Fundal Height: Appropriate for gestational age.  Extremities: Warm and well perfused.  Non tender.  Psychiatric: Behavioral normal for situation      FHT:  Fetal heart tones discontinuous due to maternal movement; baseline 115, moderate variability, variable decelerations present    TOCO:   Contractions q3 mins      Prenatal Labs:   I have personally reviewed pertinent reports.  Blood Type:   Lab Results   Component Value Date/Time    ABO Grouping B 03/01/2024 03:14 PM   D (Rh type):   Lab Results   Component Value Date/Time    Rh Factor Positive 03/01/2024 03:14 PM   Antibody Screen: Negative  HCT/HGB:   Lab Results   Component Value Date/Time    Hematocrit 41.2 07/25/2024 10:33 PM    Hemoglobin 14.0 07/25/2024 10:33 PM   MCV:   Lab Results   Component Value Date/Time    MCV 90 07/25/2024 10:33 PM   Platelets:   Lab Results   Component Value Date/Time    Platelets 243 07/25/2024 10:33 PM   1 hour Glucola:   Lab Results   Component Value Date/Time    Glucose 124 05/03/2024 03:29 PM    Rubella:   Lab Results   Component Value Date/Time    Rubella IgG Quant 54.3 03/01/2024 03:14 PM   VDRL/RPR: Non reactive   Urine Culture/Screen:   Lab Results   Component Value Date/Time    Urine Culture No Growth <1000 cfu/mL 03/01/2024 03:14 PM   Hep B:   Lab Results   Component Value Date/Time  "   Hepatitis B Surface Ag Non-reactive 03/01/2024 03:14 PM   Hep C: Non reactive  HIV: Non reactive  Chlamydia: Negative  Gonorrhea:   Lab Results   Component Value Date/Time    N gonorrhoeae, DNA Probe Negative 07/05/2024 10:50 AM   Group B Strep:    Lab Results   Component Value Date/Time    Strep Grp B PCR Negative 07/05/2024 10:50 AM            Torie Cerna MD  7/25/2024  11:22 PM        Portions of the record may have been created with voice recognition software.  Occasional wrong word or \"sound a like\" substitutions may have occurred due to the inherent limitations of voice recognition software.  Read the chart carefully and recognize, using context, where substitutions have occurred    "

## 2024-07-26 NOTE — L&D DELIVERY NOTE
Vaginal Delivery Summary - OB/GYN   Kay Barclay 19 y.o. female MRN: [id number]  Unit/Bed#: -01 Encounter: [id number]    Pre-delivery Diagnosis:   Pregnancy at 39w3d   Spontaneous rupture of membranes and onset of labor    Post-delivery Diagnosis: same, delivered    Procedure: Spontaneous Vaginal Delivery     OBGYN Practice: [facility]    Attending Physician: Dr. Ferro  Resident Physician: Dr. Torie Cerna    Anesthesia: None    QBL: Non-Surgical QBL (mL): 126        Complications: none apparent    Specimens:   1. Arterial and venous cord gases  2. Cord blood  3. Segment of umbilical cord  4. Placenta to storage     Findings:  1. Viable female  on 2024 10:35 PM via    with APGAR scores of 8  and 9  at 1 and 5 minutes, respectively. Weight pending at time of dictation for skin to skin bonding.  2. Spontaneous delivery of intact placenta at 2239  3. 1 degree laceration repaired with 3-0 Vicryl Rapide      Gases:  Umbilical Artery  Recent Labs     24  2233   PHCART 7.216*   BECART -4.5*       Umbilical Vein  Recent Labs     24  2233   PHCVEN 7.323   BECVEN -4.8*         Brief history and labor course:  Kay Barclay is a 19 y.o. [id number] 39w3d . She presented to labor and delivery for painful contractions and leakage of fluid. Her pregnancy was uncomplicated. On exam in triage she was noted to be 10/100/+3. She was brought to labor room, IV was placed, and she started pushing.    Description of delivery:    After pushing for 5 minutes, Kay delivered a viable female , weight pending as mother is doing skin to skin bonding. The fetal vertex delivered DOMINIQUE position spontaneously. There was no nuchal cord. The anterior shoulder delivered atraumatically with maternal expulsive forces and the assistance of gentle downward traction. The posterior shoulder delivered with maternal expulsive forces and the assistance of gentle upward  traction. The remainder of the fetus delivered spontaneously.      Upon delivery, the infant was placed on Kay's abdomen and the cord was doubly clamped and cut. Delayed cord clamping was achieved. The infant was noted to cry spontaneously and was moving all extremities appropriately. There was no evidence for injury. Awaiting nurse resuscitators evaluated the . Arterial and venous cord blood gases and cord blood was collected for analysis. These were promptly sent to the lab. In the immediate post-partum, active management of the 3rd stage of labor was performed with massage, the administration of 30 units of IV pitocin, and gentle traction on the umbilical cord. The placenta delivered spontaneously and was noted to have a centrally inserted 3 vessel cord.  The placenta was sent to storage.     Laceration Repair  The vagina, cervix, perineum, and rectum were inspected and there was noted to be a first degree perineal laceration.    Local anesthesia was established with 0.25% marcaine. The vaginal mucosa and submucosa were then re approximated using 3-0 vicryl rapide to the point of the hymenal ring.     At the conclusion of the procedure, all needle, sponge, and instrument counts were noted to be correct. Dr. Ferro was present and participated in all key portions of the case.    Disposition:  The patient and the  both tolerated the procedure well and are recovering in labor and delivery room       Torie Cerna MD  PGY 2, Obstetrics and Gynecology  2024  11:29 PM

## 2024-07-26 NOTE — DISCHARGE INSTRUCTIONS
Self Care After Delivery   AMBULATORY CARE:   The postpartum period is the period of time from delivery to about 6 weeks. During this time you may experience many physical and emotional changes. It is important to understand what is normal and when you need to call your healthcare provider. It is also important to know how to care for yourself during this time.  Call your local emergency number (911 in the [place]) for any of the following:   You see or hear things that are not there, or have thoughts of harming yourself or your baby.     You soak through 1 pad in 15 minutes, have blurry vision, clammy or pale skin, and feel faint.     You faint or lose consciousness.     You have trouble breathing.     You cough up blood.     Your  incision comes apart.     Seek care immediately if:   Your heart is beating faster than usual.     You have a bad headache or changes in your vision.     Your perineal tear, episiotomy site, or  incision is red, swollen, bleeding, or draining pus.     You have severe abdominal pain.     Call your doctor or obstetrician if:   Your leg is painful, red, and larger than usual.     You soak through 1 or more pads in an hour, or pass blood clots larger than a quarter from your vagina.     You have a fever.     You have new or worsening pain in your abdomen or vagina.     You continue to have depression 1 to 2 weeks after you deliver.     You have trouble sleeping.     You have foul-smelling discharge from your vagina.     You have pain or burning when you urinate.     You do not have a bowel movement for 3 days or more.     You have nausea or are vomiting.     You have hard lumps or red streaks over your breasts.     You have cracked nipples or bleed from your nipples.     You have questions or concerns about your condition or care.     Physical changes: The following are normal changes after you give birth:  Pain in the area between your anus and vagina     Breast pain      Constipation or hemorrhoids     Hot or cold flashes     Vaginal bleeding or discharge     Mild to moderate abdominal cramping     Difficulty controlling bowel movements or urine     Emotional changes: A drop in hormone levels after you deliver may cause changes in your emotions. You may feel irritable, sad, or anxious. You may cry easily or for no reason. You may also feel depressed. Depression that continues can be a sign of postpartum depression, a condition that can be treated. Treatment may include talk therapy, medicines, or both. Healthcare providers will ask how you are feeling and if you have any depression. These talks can happen during appointments for your medical care and for your baby's care, such as well child visits. Providers can help you find ways to care for yourself and your baby. Talk to your providers about the following:  When emotional changes or depression started, and if it is getting worse over time     Problems you are having with daily activities, sleep, or caring for your baby     If anything makes you feel worse, or makes you feel better     Feeling that you are not bonding with your baby the way you want     Any problems your baby has with sleeping or feeding     Your baby is fussy or cries a lot     Support you have from friends, family, or others     Breast care for breastfeeding mothers: You may have sore breasts for 3 to 6 days after you give birth. This happens as your milk begins to fill your breasts. You may also have sore breasts if you do not breastfeed frequently. Do the following to care for your breasts:  Apply a moist, warm, compress to your breast as directed. This may help soothe your breasts. Make sure the washcloth is not too hot before you apply it to your breast.     Nurse your baby or pump your milk frequently. This may prevent clogged milk ducts. Ask your healthcare provider how often to nurse or pump.     Massage your breasts as directed. This may help increase  your milk flow. Gently rub your breasts in a circular motion before you breastfeed. You may need to gently squeeze your breast or nipple to help release milk. You can also use a breast pump to help release milk from your breast.     Wash your breasts with warm water only. Do not put soap on your nipples. Soap may cause your nipples to become dry.     Apply lanolin cream to your nipples as directed. Lanolin cream may add moisture to your skin and prevent nipple dryness. Always wash off lanolin cream with warm water before you breastfeed.     Place pads in your bra. Your nipples may leak milk when you are not breastfeeding. You can place pads inside of your bra to help prevent leaking onto your clothing. Ask your healthcare provider where to purchase bra pads.     Get breastfeeding support if needed. Healthcare providers can answer questions about breastfeeding and provide you with support. Ask your healthcare provider who you can contact if you need breastfeeding support.     Breast care for non-breastfeeding mothers: Milk will fill your breasts even if you bottle feed your baby. Do the following to help stop your milk from filling your breasts and causing pain:  Wear a bra with support at all times. A sports bra or a tight-fitting bra will help stop your milk from coming in.     Apply ice on each breast for 15 to 20 minutes every hour or as directed. Use an ice pack, or put crushed ice in a plastic bag. Cover it with a towel before you apply it to your breast. Ice helps your milk ducts shrink.     Keep your breasts away from warm water. Warm water will make it easier for milk to fill your breasts. Stand with your breasts away from warm water in the shower.     Limit how much you touch your breasts. This will prevent them from filling with milk.     Perineum care: Your perineum is the area between your rectum and vagina. It is normal to have swelling and pain in this area after you give birth. If you had an  episiotomy, your healthcare provider may give you special instructions.  Clean your perineum after you use the bathroom. This may prevent infection and help with healing. Use a spray bottle with warm water to clean your perineum. You may also gently spray warm water against your perineum when you urinate. Always wipe front to back.     Take a sitz bath as directed. A sitz bath may help relieve swelling and pain. Fill your bath tub or bucket with water up to your hips and sit in the water. Use cold water for 2 days after you deliver. Then use warm water. Ask your healthcare provider for more information about a sitz bath.     Apply ice packs for the first 24 hours or as directed. Use a plastic glove filled with ice or buy an ice pack. Wrap the ice pack or plastic glove in a small towel or wash cloth. Place the ice pack on your perineum for 20 minutes at a time.     Sit on a donut-shaped pillow. This may relieve pressure on your perineum when you sit.     Use wipes that contain medicine or take pills as directed. Your healthcare provider may tell you to use witch hazel pads. You can place witch hazel pads in the refrigerator before you apply them to your perineum. Your provider may also tell you to take NSAIDs. Ask him or her how often to take pills or use the wipes.     Do not go swimming or take tub baths for 4 to 6 weeks or as directed. This will help prevent an infection in your vagina or uterus.     Bowel and bladder care: It may take 3 to 5 days to have a bowel movement after you deliver your baby. You can do the following to prevent or manage constipation, and get control of your bowel or bladder:  Take stool softeners as directed. A stool softener is medicine that will make your bowel movements softer. This may prevent or relieve constipation. A stool softener may also make bowel movements less painful.     Drink plenty of liquids. Ask how much liquid to drink each day and which liquids are best for you.  Liquids may help prevent constipation.     Eat foods high in fiber. Examples include fruits, vegetables, grains, beans, and lentils. Ask your healthcare provider how much fiber you need each day. Fiber may prevent constipation.          Do Kegel exercises as directed. Kegel exercises will help strengthen the muscles that control bowel movements and urination. Ask your healthcare provider for more information on Kegel exercises.     Apply cold compresses or medicine to hemorrhoids as directed. This may relieve swelling and pain. Your healthcare provider may tell you to apply ice or wipes that contain medicine to your hemorrhoids. He or she may also tell you to use a sitz bath. Ask your provider for more information on how to manage hemorrhoids.     Nutrition: Good nutrition is important in the postpartum period. It will help you return to a healthy weight, increase your energy levels, and prevent constipation. It will also help you get enough nutrients and calories if you are going to breastfeed your baby.  Eat a variety of healthy foods. Healthy foods include fruits, vegetables, whole-grain breads, low-fat dairy products, beans, lean meats, and fish. You may need 500 to 700 extra calories each day if you breastfeed your baby. You may also need extra protein.          Limit foods with added sugar and high amounts of fat. These foods are high in calories and low in healthy nutrients. Read food labels so you know how much sugar and fat is in the food you want to eat.     Drink 8 to 10 glasses of water per day. Water will help you make plenty of milk for your baby. It will also help prevent constipation. Drink a glass of water every time you breastfeed your baby.     Take vitamins as directed. Ask your healthcare provider what vitamins you need.     Limit caffeine and alcohol if you are breastfeeding. Caffeine and alcohol can get into your breast milk. Caffeine and alcohol can make your baby fussy. They can also  interfere with your baby's sleep. Ask your healthcare provider if you can drink alcohol or caffeine.     Rest and sleep: You may feel very tired in the postpartum period. Enough sleep will help you heal and give you energy to care for your baby. The following may help you get sleep and rest:  Nap when your baby naps. Your baby may nap several times during the day. Get rest during this time.     Limit visitors. Many people may want to see you and your baby. Ask friends or family to visit on different days. This will give you time to rest.     Do not plan too much for one day. Put off household chores so that you have time to rest. Gradually do more each day.     Ask for help from family, friends, or neighbors. Ask them to help you with laundry, cleaning, or errands. Also ask someone to watch the baby while you take a nap or relax. Ask your partner to help with the care of your baby. Pump some of your breast milk so your partner can feed your baby during the night.     Exercise after delivery: Wait until your healthcare provider says it is okay to exercise. Exercise can help you lose weight, increase your energy levels, and manage your mood. It can also prevent constipation and blood clots. Start with gentle exercises such as walking. Do more as you have more energy. You may need to avoid abdominal exercises for 1 to 2 weeks after you deliver. Talk to your healthcare provider about an exercise plan that is right for you.     Sexual activity after delivery:   Do not have sex until your healthcare provider says it is okay. You may need to wait 4 to 6 weeks before you have sex. This may prevent infection and allow time to heal.     Your menstrual cycle may begin as soon as 3 weeks after you deliver. Your period may be delayed if you breastfeed your baby. You can become pregnant before you get your first postpartum period. Talk to your healthcare provider about birth control that is right for you. Some types of birth  control are not safe during breastfeeding.     For support and more information: Join a support group for new mothers. Ask for help from family and friends with chores, errands, and care of your baby.  Office of Women's Health,  Department of Health and Human Services  [address] [place] [address]  [address] [place] [address]  Phone: [phone]  Web Address: www.womenshealth.gov  [facility]  [address] [place] [address]  Web Address: [email or web address]  Follow up with your doctor or obstetrician as directed: You will need to follow up within 2 to 6 weeks of delivery. Write down your questions so you remember to ask them at your visits.  © Copyright [employer] 2020 Information is for End User's use only and may not be sold, redistributed or otherwise used for commercial purposes. All illustrations and images included in CareNotes® are the copyrighted property of [employer] or [employer]  The above information is an  only. It is not intended as medical advice for individual conditions or treatments. Talk to your doctor, nurse or pharmacist before following any medical regimen to see if it is safe and effective for you.

## 2024-07-26 NOTE — DISCHARGE SUMMARY
Discharge Summary - Kay Barclay 19 y.o. female MRN: [id number]    Unit/Bed#: -01 Encounter: [id number]    ADMISSION  Admission Date: 2024   Admitting Attending: Dr. Destiny Ferro MD  Admitting Diagnoses:   Patient Active Problem List   Diagnosis    Not immune to HBV    39 weeks gestation of pregnancy     (spontaneous vaginal delivery)       DELIVERY  Delivery Method: Vaginal, Spontaneous   Delivery Date and Time: 2024 10:35 PM  Delivery Attending: Destiny Ferro    DISCHARGE  Discharge Date: 24  Discharge Attending:   Discharge Diagnosis:   Same, Delivered    Clinical course: Admission to Delivery  Kay Barclay is a 19 y.o. [id number] . She presented to labor and delivery for painful contractions and leakage of fluid. Her pregnancy was uncomplicated. On exam in triage she was noted to be 10/100/+3. She was brought to labor room, IV was placed, and she started pushing.       Delivery  Route of Delivery: Vaginal, Spontaneous    Anesthesia: None,   QBL: Non-Surgical QBL (mL): 126        Delivery: Vaginal, Spontaneous at 2024 10:35 PM  Laceration: Perineal: 1° Repaired? Yes    Baby's Weight: 2860 g (6 lb 4.9 oz); 100.88    Apgar scores: 8  and 9  at 1 and 5 minutes, respectively      Clinical Course: Post-Delivery:  The post delivery course was unremarkable.    On the day of discharge, the patient was ambulating, voiding spontaneously, tolerating oral intake, and hemodynamically stable. She was able to reasonably perform all ADLs. She had appropriate bowel function. Pain was well-controlled. She was discharged home on postpartum/postop day #2 without complications. Patient was instructed to follow up with her OB as an outpatient and was given appropriate warnings to call her provider with problems or concerns.    Pertinent lab findings included:   Blood type B positive.     Last three Hgb values:  Lab Results   Component Value Date    HGB 14.0 2024    HGB 11.5  2024    HGB 12.2 2024        Problem-specific follow-up plans included the following:  Problem List          Unprioritized    Not immune to HBV    Overview     Needs HBV vaccine booster postpartum         Current Assessment & Plan     Hep B booster ordered          39 weeks gestation of pregnancy    Overview                  * (Principal)  (spontaneous vaginal delivery)    Current Assessment & Plan     Recovering well   Routine postpartum care, encourage ambulation, encourage familial bonding  Lactation support for breast/bottle feeding as needed  FEN: Tolerating regular diet  Pain: Motrin/Tylenol around the clock  Appropriate bowel and bladder function   Postpartum Contraceptive plan: Nexplanon in office PP           Other Visit Diagnoses       Postpartum care and examination of lactating mother                 Discharge med list:  Contraception: Nexplanon in office PP      Medication List      ASK your doctor about these medications     Prenatal Plus Vitamin/Mineral 27-1 MG Tabs; Take 1 tablet by mouth in   the morning       Condition at discharge:   good     Disposition:   See After Visit Summary for discharge disposition information.    Planned Readmission:   No

## 2024-07-26 NOTE — PLAN OF CARE
Problem: PAIN - ADULT  Goal: Verbalizes/displays adequate comfort level or baseline comfort level  Description: Interventions:  - Encourage patient to monitor pain and request assistance  - Assess pain using appropriate pain scale  - Administer analgesics based on type and severity of pain and evaluate response  - Implement non-pharmacological measures as appropriate and evaluate response  - Consider cultural and social influences on pain and pain management  - Notify physician/advanced practitioner if interventions unsuccessful or patient reports new pain  Outcome: Progressing     Problem: INFECTION - ADULT  Goal: Absence or prevention of progression during hospitalization  Description: INTERVENTIONS:  - Assess and monitor for signs and symptoms of infection  - Monitor lab/diagnostic results  - Monitor all insertion sites, i.e. indwelling lines, tubes, and drains  - Monitor endotracheal if appropriate and nasal secretions for changes in amount and color  - [place] appropriate cooling/warming therapies per order  - Administer medications as ordered  - Instruct and encourage patient and family to use good hand hygiene technique  - Identify and instruct in appropriate isolation precautions for identified infection/condition  Outcome: Progressing  Goal: Absence of fever/infection during neutropenic period  Description: INTERVENTIONS:  - Monitor WBC    Outcome: Progressing     Problem: SAFETY ADULT  Goal: Patient will remain free of falls  Description: INTERVENTIONS:  - Educate patient/family on patient safety including physical limitations  - Instruct patient to call for assistance with activity   - Consult OT/PT to assist with strengthening/mobility   - Keep Call bell within reach  - Keep bed low and locked with side rails adjusted as appropriate  - Keep care items and personal belongings within reach  - Initiate and maintain comfort rounds  - Make Fall Risk Sign visible to staff  - Apply yellow socks and bracelet  for high fall risk patients  - Consider moving patient to room near nurses station  Outcome: Progressing  Goal: Maintain or return to baseline ADL function  Description: INTERVENTIONS:  -  Assess patient's ability to carry out ADLs; assess patient's baseline for ADL function and identify physical deficits which impact ability to perform ADLs (bathing, care of mouth/teeth, toileting, grooming, dressing, etc.)  - Assess/evaluate cause of self-care deficits   - Assess range of motion  - Assess patient's mobility; develop plan if impaired  - Assess patient's need for assistive devices and provide as appropriate  - Encourage maximum independence but intervene and supervise when necessary  - Involve family in performance of ADLs  - Assess for home care needs following discharge   - Consider OT consult to assist with ADL evaluation and planning for discharge  - Provide patient education as appropriate  Outcome: Progressing  Goal: Maintains/Returns to pre admission functional level  Description: INTERVENTIONS:  - Perform AM-PAC 6 Click Basic Mobility/ Daily Activity assessment daily.  - Set and communicate daily mobility goal to care team and patient/family/caregiver.   - Collaborate with rehabilitation services on mobility goals if consulted  - Out of bed for toileting  - Record patient progress and toleration of activity level   Outcome: Progressing     Problem: Knowledge Deficit  Goal: Patient/family/caregiver demonstrates understanding of disease process, treatment plan, medications, and discharge instructions  Description: Complete learning assessment and assess knowledge base.  Interventions:  - Provide teaching at level of understanding  - Provide teaching via preferred learning methods  Outcome: Progressing     Problem: DISCHARGE PLANNING  Goal: Discharge to home or other facility with appropriate resources  Description: INTERVENTIONS:  - Identify barriers to discharge w/patient and caregiver  - Arrange for needed  discharge resources and transportation as appropriate  - Identify discharge learning needs (meds, wound care, etc.)  - Arrange for interpretive services to assist at discharge as needed  - Refer to Case Management Department for coordinating discharge planning if the patient needs post-hospital services based on physician/advanced practitioner order or complex needs related to functional status, cognitive ability, or social support system  Outcome: Progressing     Problem: POSTPARTUM  Goal: Experiences normal postpartum course  Description: INTERVENTIONS:  - Monitor maternal vital signs  - Assess uterine involution and lochia  Outcome: Progressing  Goal: Appropriate maternal -  bonding  Description: INTERVENTIONS:  - Identify family support  - Assess for appropriate maternal/infant bonding   -Encourage maternal/infant bonding opportunities  - Referral to  or  as needed  Outcome: Progressing  Goal: Establishment of infant feeding pattern  Description: INTERVENTIONS:  - Assess breast/bottle feeding  - Refer to lactation as needed  Outcome: Progressing  Goal: Incision(s), wounds(s) or drain site(s) healing without S/S of infection  Description: INTERVENTIONS  - Assess and document dressing, incision, wound bed, drain sites and surrounding tissue  - Provide patient and family education  \\  Outcome: Progressing

## 2024-07-26 NOTE — PROGRESS NOTES
"Progress Note - OB/GYN  Kay Barclay 19 y.o. female MRN: [id number]  Unit/Bed#:  412-01 Encounter: [id number]    Assessment and Plan     Kay Barclay is a patient of: [facility]. She is PPD# 1 s/p  spontaneous vaginal delivery  Recovering well and is stable.     *  (spontaneous vaginal delivery)  Assessment & Plan  Recovering well   Routine postpartum care, encourage ambulation, encourage familial bonding  Lactation support for breast/bottle feeding as needed  Pre-delivery Hgb 14  FEN: Tolerating regular diet  Pain: Motrin/Tylenol around the clock, oxycodone if needed  Appropriate bowel and bladder function   Postpartum Contraceptive plan: **  DVT Ppx: Ambulation, SCDs          Disposition    - Anticipate discharge home on PPD# 2  Barriers to discharge: None      Thalia Gabriel MD  OBGYN PGY-1  2024 6:48 AM    Subjective/Objective     Chief Complaint: Postpartum State     Subjective:    Kay Barclay is PPD/POD#1 s/p  spontaneous vaginal delivery. She has no current complaints and had no acute events overnight.  Pain is well controlled.  Patient is currently voiding.  She is ambulating.  Patient is currently passing flatus and has had no bowel movement. She is tolerating PO, and denies nausea or vomitting. Patient denies fever, chills, chest pain, shortness of breath, or calf tenderness. Lochia is normal. She is  Breastfeeding. She is recovering well and is stable.       Vitals:   /87 (BP Location: Left arm)   Pulse (!) 115   Temp 98.2 °F (36.8 °C) (Temporal)   Resp 18   Ht 5' 3\" (1.6 m)   Wt 62.1 kg (137 lb)   LMP  (LMP Unknown)   SpO2 98%   Breastfeeding Unknown   BMI 24.27 kg/m²       Intake/Output Summary (Last 24 hours) at 2024 0648  Last data filed at 2024 0601  Gross per 24 hour   Intake --   Output 576 ml   Net -576 ml       Physical Exam:   GEN: Kay Barclay appears well, alert and oriented x 3, pleasant and cooperative "   CARDIO: RRR, intact distals pulses  RESP:  non-labored breathing  ABDOMEN: soft, no tenderness, fundus @ umb.  EXTREMITIES: Non tender, no erythema, b/l Kosta's sign negative    Labs:   Recent Results (from the past 72 hour(s))   Type and screen    Collection Time: 07/25/24 10:33 PM   Result Value Ref Range    ABO Grouping B     Rh Factor Positive     Antibody Screen Negative     Specimen Expiration Date 20240728    CBC    Collection Time: 07/25/24 10:33 PM   Result Value Ref Range    WBC 13.91 (H) 4.31 - 10.16 Thousand/uL    RBC 4.56 3.81 - 5.12 Million/uL    Hemoglobin 14.0 11.5 - 15.4 g/dL    Hematocrit 41.2 34.8 - 46.1 %    MCV 90 82 - 98 fL    MCH 30.7 26.8 - 34.3 pg    MCHC 34.0 31.4 - 37.4 g/dL    RDW 11.9 11.6 - 15.1 %    Platelets 243 149 - 390 Thousands/uL    MPV 10.0 8.9 - 12.7 fL   CORD, Blood gas, venous    Collection Time: 07/25/24 10:33 PM   Result Value Ref Range    pH, Cord Niko 7.323 7.190 - 7.490    pCO2, Cord Niko 41.3 27.0 - 43.0 mm HG    pO2, Cord Niko 38.1 15.0 - 45.0 mm HG    HCO3, Cord Niko 20.9 12.2 - 28.6 mmol/L    Base Exc, Cord Niko -4.8 (L) 1.0 - 9.0 mmol/L    O2 Cont, Cord Niko 18.8 mL/dL    O2 HGB,VENOUS CORD 77.7 %   CORD, Blood gas, arterial    Collection Time: 07/25/24 10:33 PM   Result Value Ref Range    pH, Cord Art 7.216 (L) 7.230 - 7.430    pCO2, Cord Art 62.4 (H) 30.0 - 60.0    pO2, Cord Art 13.2 5.0 - 25.0 mm HG    HCO3, Cord Art 24.7 17.3 - 27.3 mmol/L    Base Exc, Cord Art -4.5 (L) 3.0 - 11.0 mmol/L    O2 Content, Cord Art 4.3 ml/dl    O2 Hgb, Arterial Cord 18.2 %       Meds:      Current Facility-Administered Medications:     acetaminophen (TYLENOL) tablet 650 mg, 650 mg, Oral, Q4H PRN, Torie Cerna MD    benzocaine-menthol-lanolin-aloe (DERMOPLAST) 20-0.5 % topical spray 1 Application, 1 Application, Topical, Q6H PRN, Torie Cerna MD, 1 Application at 07/26/24 0104    calcium carbonate (TUMS) chewable tablet 1,000 mg, 1,000 mg, Oral, Daily PRN, Torie  MD Eryn    hydrocortisone 1 % cream 1 Application, 1 Application, Topical, Daily PRN, Torie Cerna MD    ibuprofen (MOTRIN) tablet 600 mg, 600 mg, Oral, Q6H, Torie Cerna MD, 600 mg at 07/26/24 0637    lactated ringers infusion, 125 mL/hr, Intravenous, Continuous, Torie Cerna MD    ondansetron (ZOFRAN) injection 4 mg, 4 mg, Intravenous, Q8H PRN, Torie Cerna MD    senna (SENOKOT) tablet 8.6 mg, 1 tablet, Oral, Daily, Torie Cerna MD    simethicone (MYLICON) chewable tablet 80 mg, 80 mg, Oral, 4x Daily PRN, Torie Saviers-Moy, MD    witch hazel-glycerin (TUCKS) topical pad 1 Pad, 1 Pad, Topical, Q4H PRN, Torie Cerna MD, 1 Pad at 07/26/24 0104

## 2024-07-26 NOTE — PLAN OF CARE
Problem: BIRTH - VAGINAL/ SECTION  Goal: Fetal and maternal status remain reassuring during the birth process  Description: INTERVENTIONS:  - Monitor vital signs  - Monitor fetal heart rate  - Monitor uterine activity  - Monitor labor progression (vaginal delivery)  - DVT prophylaxis  - Antibiotic prophylaxis  Outcome: Completed  Goal: Emotionally satisfying birthing experience for mother/fetus  Description: Interventions:  - Assess, plan, implement and evaluate the nursing care given to the patient in labor  - Advocate the philosophy that each childbirth experience is a unique experience and support the family's chosen level of involvement and control during the labor process   - Actively participate in both the patient's and family's teaching of the birth process  - Consider cultural, U[place] and age-specific factors and plan care for the patient in labor  Outcome: Completed

## 2024-07-26 NOTE — LACTATION NOTE
This note was copied from a baby's chart.  CONSULT - LACTATION  Baby Girl (Nathaly Barclay 1 days female MRN: [id number]    [facility] NURS[facility] Room / Bed: (N)/(N) Encounter: [id number]    Maternal Information     MOTHER:  Kay Barclay  Maternal Age: 19 y.o.  OB History: # 1 - Date: 24, Sex: Female, Weight: 2860 g (6 lb 4.9 oz), GA: 39w3d, Type: Vaginal, Spontaneous, Apgar1: 8, Apgar5: 9, Living: Living, Birth Comments: None   Previouse breast reduction surgery? No    Lactation history:   Has patient previously breast fed: No   How long had patient previously breast fed:     Previous breast feeding complications:       Past Surgical History:   Procedure Laterality Date    NO PAST SURGERIES         Birth information:  YOB: 2025   Time of birth: 10:35 PM   Sex: female   Delivery type: Vaginal, Spontaneous   Birth Weight: 2860 g (6 lb 4.9 oz)   Percent of Weight Change: 0%     Gestational Age: 39w3d   [unfilled]    Assessment     Breast and nipple assessment: normal assessment     Assessment: normal assessment    Feeding assessment: feeding well    LATCH:  Latch: Repeated attempts, hold nipple in mouth, stimulate to suck   Audible Swallowing: A few with stimulation   Type of Nipple: Everted (After stimulation)   Comfort (Breast/Nipple): Soft/non-tender   Hold (Positioning): Partial assist, teach one side, mother does other, staff holds   LATCH Score: 7          Feeding recommendations:  breast feed on demand    Met with Family Provided with Ready, Set, Baby booklet which contained information on:  Hand expression with access to QR codes to review hand expression.  Positioning and latch reviewed as well as showing images of other feeding positions.  Discussed the properties of a good latch in any position.   Feeding on cue and what that means for recognizing infant's hunger, s/s that baby is getting enough milk and some s/s that  breastfeeding dyad may need further help  Skin to Skin contact and benefits to mom and baby  Avoidance of pacifiers for the first month discussed.   Gave information on common concerns, what to expect the first few weeks after delivery, preparing for other caregivers, and how partners can help. Resources for support also provided.    Education on positioning and alignment. Mom is encouraged to:     - Bring baby up to the breast (use of pillows to elevate so baby's torso is against mom's breasts)   - Skin to skin for feedings with top hand exposed to show signs of satiation   - Chin deep into breast tissue (make baby look up to the nipple)   - nose aligned to the nipple   -Wait for wide gape, drag chin on the breast so nipple is aimed at the upper, back palate  - Cheek should be touching breast   - Deep, firm hold of baby with ear, shoulder, hip alignment    Mom wanted assistance nursing baby. Mom chose to start on right side. With permission, Worked on positioning infant up at chest level and starting to feed infant with nose arriving at the nipple. Then, using areolar compression to achieve a deep latch that is comfortable and exchanges optimum amounts of milk. Dyad helped with deep latch after a few attempts baby suckled well with stimulation till popped off with relaxed tone. Then to left breast also using football hold. Quick latch. Stimulated to keep rocker suckling till asleep at breast.     Also reviewed  discharge breastfeeding booklet including the feeding log. Emphasized 8 or more (12) feedings in a 24 hour period, what to expect for the number of diapers per day of life and the progression of properties of the  stooling pattern.    List of reasons to call a lactation consultant.  Feeding logs  Feeding cues  Hand expression  Baby's Second day (cluster feeding)  Breastfeeding and Your Lifestyle (Medications, Alcohol, Caffeine, Smoking, Street Drugs, Methadone)  First Two Weeks Survival Guide for  Breastfeeding  Breast Changes  Physical Therapy  Storage and Handling of Breast milk  How to Keep Your Breast Pump Kit Clean  The Employed Breastfeeding Mother  Mixed feeding  Bottle feeding like breastfeeding (paced bottle feeding)  astfeeding and your lifestyle, storage and preparation of breast milk, how to keep you breast pump clean, the employed breastfeeding mother and paced bottle feeding handouts.     Booklet included Breastfeeding Resources for after discharge including access to the number for the [facility].    [employer] MOB  to call for assistance, questions and concerns.  Extension number for inpatient lactation support provided.                  Tracey Valenzuela RN 7/26/2024 6:08 PM

## 2024-07-27 ENCOUNTER — LACTATION ENCOUNTER (OUTPATIENT)
Dept: NURSERY | Facility: HOSPITAL | Age: 19
End: 2024-07-27

## 2024-07-27 VITALS
BODY MASS INDEX: 24.27 KG/M2 | RESPIRATION RATE: 16 BRPM | HEIGHT: 63 IN | WEIGHT: 137 LBS | HEART RATE: 82 BPM | SYSTOLIC BLOOD PRESSURE: 117 MMHG | OXYGEN SATURATION: 100 % | TEMPERATURE: 98.4 F | DIASTOLIC BLOOD PRESSURE: 77 MMHG

## 2024-07-27 LAB — TREPONEMA PALLIDUM IGG+IGM AB [PRESENCE] IN SERUM OR PLASMA BY IMMUNOASSAY: NORMAL

## 2024-07-27 PROCEDURE — 90746 HEPB VACCINE 3 DOSE ADULT IM: CPT | Performed by: NURSE PRACTITIONER

## 2024-07-27 PROCEDURE — 99024 POSTOP FOLLOW-UP VISIT: CPT | Performed by: NURSE PRACTITIONER

## 2024-07-27 RX ORDER — IBUPROFEN 600 MG/1
600 TABLET ORAL EVERY 6 HOURS PRN
Start: 2024-07-27

## 2024-07-27 RX ORDER — BENZOCAINE/MENTHOL 6 MG-10 MG
1 LOZENGE MUCOUS MEMBRANE DAILY PRN
Start: 2024-07-27

## 2024-07-27 RX ORDER — ACETAMINOPHEN 325 MG/1
650 TABLET ORAL EVERY 6 HOURS PRN
Start: 2024-07-27

## 2024-07-27 RX ADMIN — HEPATITIS B VACCINE (RECOMBINANT) 20 MCG: 20 INJECTION, SUSPENSION INTRAMUSCULAR at 08:33

## 2024-07-27 RX ADMIN — SENNOSIDES 8.6 MG: 8.6 TABLET, FILM COATED ORAL at 08:30

## 2024-07-27 NOTE — LACTATION NOTE
This note was copied from a baby's chart.    Mom states feeding as ging well. Denies need for assistance or supplies. Reminded of support available.      07/27/24 1130   Maternal Information   Has mother  before? No   Infant to breast within first hour of birth? Yes   Exclusive Pump and Bottle Feed No   LATCH Documentation   Having latch problems? No  (as per Mom; documented latch = 7; encouraged lactation support)   Breasts/Nipples   Breastfeeding Status Yes   Breastfeeding Progress Not yet established;Other issues (comment)  (Early supplementation)   Reasons for not Breastfeeding Maternal preference  (Mixed feeding intent)   Other OB Lactation Tools   Feeding Devices Bottle   Breast Pump   Pump 3  (Zomee Z2 sent to Home)   Patient Follow-Up   Lactation Consult Status 2   Follow-Up Type Inpatient;Call as needed   Other OB Lactation Documentation    Additional Problem Noted Mom prepared for D/C; reminded of support available  (BOTH Booklets at bedside)       [employer] MOB  to call for assistance, questions and concerns.  Extension number for inpatient lactation support provided.

## 2024-07-27 NOTE — PROGRESS NOTES
"Progress Note - OB/GYN  Kay Barclay 19 y.o. female MRN: [id number]  Unit/Bed#:  412-01 Encounter: [id number]    Assessment and Plan     Kay Barclay is a patient of: [facility].  She is PPD# 2 s/p  spontaneous vaginal delivery  Recovering well and is stable       Not immune to HBV  Assessment & Plan  Hep B booster ordered     *  (spontaneous vaginal delivery)  Assessment & Plan  Recovering well   Routine postpartum care, encourage ambulation, encourage familial bonding  Lactation support for breast/bottle feeding as needed  FEN: Tolerating regular diet  Pain: Motrin/Tylenol around the clock  Appropriate bowel and bladder function   Postpartum Contraceptive plan: Nexplanon in office PP         Disposition    - Anticipate discharge home on PPD# 2      Subjective/Objective     Chief Complaint: Postpartum State     Subjective:    Kay Barclay is PPD/POD#2 s/p  spontaneous vaginal delivery. She has no current complaints.  Pain is well controlled. She is recovering well and is stable.  Having generalized soreness, improved with APAP / IBU      Breastfeeding:  yes  Tolerating PO: yes  Nausea or Vomiting: no  Voiding: yes  Flatus: yes; has had no bowel movement.   Ambulating: yes  Chest pain: no  Shortness of breath: no  Leg pain: no  Lochia: moderate     Vitals:   /77   Pulse 82   Temp 98.4 °F (36.9 °C) (Temporal)   Resp 16   Ht 5' 3\" (1.6 m)   Wt 62.1 kg (137 lb)   LMP  (LMP Unknown)   SpO2 100%   Breastfeeding Yes   BMI 24.27 kg/m²     No intake or output data in the 24 hours ending 24 0753        Physical Exam:   GEN: Kay Barclay appears well, alert and oriented x 3, pleasant and cooperative   CARDIO: RRR, no murmurs or rubs  RESP:  CTAB, no wheezes or rales  ABDOMEN: soft, no tenderness, no distention, fundus @ U-1  EXTREMITIES: no edema, b/l Kosta's sign negative      Labs:     Hemoglobin   Date Value Ref Range Status   2024 14.0 11.5 - 15.4 g/dL Final "   05/03/2024 11.5 11.5 - 15.4 g/dL Final     WBC   Date Value Ref Range Status   07/25/2024 13.91 (H) 4.31 - 10.16 Thousand/uL Final   05/03/2024 9.56 4.31 - 10.16 Thousand/uL Final     Platelets   Date Value Ref Range Status   07/25/2024 243 149 - 390 Thousands/uL Final   05/03/2024 235 149 - 390 Thousands/uL Final     Creatinine   Date Value Ref Range Status   07/26/2019 0.82 0.60 - 1.30 mg/dL Final     Comment:     Standardized to IDMS reference method   05/26/2018 0.84 0.60 - 1.30 mg/dL Final     Comment:     Standardized to IDMS reference method     AST   Date Value Ref Range Status   07/26/2019 17 5 - 45 U/L Final     Comment:       Specimen collection should occur prior to Sulfasalazine administration due to the potential for falsely depressed results.    05/26/2018 16 5 - 45 U/L Final     Comment:       Specimen collection should occur prior to Sulfasalazine administration due to the potential for falsely depressed results.      ALT   Date Value Ref Range Status   07/26/2019 16 12 - 78 U/L Final     Comment:       Specimen collection should occur prior to Sulfasalazine administration due to the potential for falsely depressed results.    05/26/2018 16 12 - 78 U/L Final     Comment:       Specimen collection should occur prior to Sulfasalazine administration due to the potential for falsely depressed results.           RANCHO Love  7/27/2024  7:53 AM

## 2024-07-27 NOTE — CASE MANAGEMENT
Case Management Progress Note    Patient name Kay Barclay  Location /-01 MRN [id number]  : 2005 Date 2024       LOS (days): 2  Geometric Mean LOS (GMLOS) (days):   Days to GMLOS:        OBJECTIVE:        Current admission status: Inpatient  [facility] Pharm[facility]:   [id number] - [place] - [address]  [address]   Phone: [phone] Fax: [phone]    Primary Care Provider: No primary care provider on file.    Primary Insurance: [employer]  Secondary Insurance:     PROGRESS NOTE:    CM received consult for breast pump. MOB requesting Zomee Z2  pump. CM placed order via [employer]. Informed MOB that [employer] would not process order until Monday and pump would be shipped to home. Home address confirmed.

## 2024-07-29 ENCOUNTER — DOCUMENTATION (OUTPATIENT)
Dept: CASE MANAGEMENT | Facility: HOSPITAL | Age: 19
End: 2024-07-29

## 2024-07-29 ENCOUNTER — POSTPARTUM VISIT (OUTPATIENT)
Dept: OBGYN CLINIC | Facility: CLINIC | Age: 19
End: 2024-07-29

## 2024-07-29 VITALS
BODY MASS INDEX: 22.36 KG/M2 | HEART RATE: 90 BPM | WEIGHT: 126.2 LBS | SYSTOLIC BLOOD PRESSURE: 117 MMHG | DIASTOLIC BLOOD PRESSURE: 82 MMHG | HEIGHT: 63 IN

## 2024-07-29 DIAGNOSIS — M79.651 RIGHT THIGH PAIN: Primary | ICD-10-CM

## 2024-07-29 PROCEDURE — 99213 OFFICE O/P EST LOW 20 MIN: CPT | Performed by: NURSE PRACTITIONER

## 2024-07-29 NOTE — PROGRESS NOTES
"Ambulatory Visit  Name: Kay Barclay      : 2005      MRN: [id number]  Encounter Provider: RANCHO Brewster  Encounter Date: 2024   Encounter department: [facility]    Assessment & Plan   1. Right thigh pain  -      VAS VENOUS DUPLEX - LOWER LIMB BILATERAL; Future; Expected date: 2024  2.  (spontaneous vaginal delivery)  -      VAS VENOUS DUPLEX - LOWER LIMB BILATERAL; Future; Expected date: 2024  3. Postpartum state  -      VAS VENOUS DUPLEX - LOWER LIMB BILATERAL; Future; Expected date: 2024    Plan  Schedule ultrasound of lower extremities  Take Motrin and use warm heat for pain  Call with needs or concerns  Schedule postpartum visit in 3 weeks    History of Present Illness     Kay Barclay is a 19 y.o. female who presents with her moth with concerns she has R tigh pain, pt states the pain starts in her buttock and moves down her thigh  Pt had a baby 5 days ago  Denies redness of the area where she experiences pain    Explained doppler studies of both lower extremities would be ordered.    Review of Systems  .Pertinent items are note in the HPI      Objective     /82 (BP Location: Right arm, Patient Position: Sitting)   Pulse 90   Ht 5' 3\" (1.6 m)   Wt 57.2 kg (126 lb 3.2 oz)   LMP 2024 (Exact Date)   Breastfeeding Yes   BMI 22.36 kg/m²     Physical Exam  Constitutional:       Appearance: Normal appearance.   Eyes:      General:         Right eye: No discharge.         Left eye: No discharge.   Pulmonary:      Effort: Pulmonary effort is normal. No respiratory distress.   Musculoskeletal:         General: Normal range of motion.      Cervical back: Normal range of motion.   Neurological:      Mental Status: She is alert and oriented to person, place, and time.   Psychiatric:         Mood and Affect: Mood normal.         Behavior: Behavior normal.         Thought Content: Thought content normal.   Negative " cough or SOB  R thigh negative erythema or edema  Administrative Statements

## 2024-07-29 NOTE — UTILIZATION REVIEW
"Mother and baby discharged on 2024         NOTIFICATION OF INPATIENT ADMISSION   MATERNITY/DELIVERY AUTHORIZATION REQUEST   SERVICING FACILITY:   [facility]  Parent Child Health - L&D, , NICU  [address] [place] [address]  Tax ID: [id number]  NPI: [id number] ATTENDING PROVIDER:  Attending Name and NPI#: Destiny Ferro Md [[id number]]  Address: [address] [place] [address]  Phone: [phone]     ADMISSION INFORMATION:  Place of Service: Inpatient [facility]  Place of Service Code: 21  Inpatient Admission Date/Time: 24 10:15 PM  Discharge Date/Time: 2024  5:25 PM  Admitting Diagnosis Code/Description:  Uterine contractions [O47.9]   Mother: Kay Barclay 2005 Estimated Date of Delivery: 24  Delivering clinician: Destiny Ferro   OB History          1    Para   1    Term   1       0    AB   0    Living   1         SAB   0    IAB   0    Ectopic   0    Multiple   0    Live Births   1                Name & MRN:   Information for the patient's :  Deny, Baby Girl (Kay) [[id number]]   [place] Delivery Information:  Sex: female  Delivered 2024 10:35 PM by Vaginal, Spontaneous; Gestational Age: 39w3d     Measurements:  Weight: 6 lb 4.9 oz (2860 g);  Height: 20.5\"    APGAR 1 minute 5 minutes 10 minutes   Totals: 8 9       UTILIZATION REVIEW CONTACT:  Jennifer Shaw, Utilization   Network Utilization Review Department  Phone: [phone]  Fax [phone]  Email: [email or web address]  Contact for approvals/pending authorizations, clinical reviews, and discharge.   PHYSICIAN ADVISORY SERVICES:  Medical Necessity Denial & [place] Review  Phone: [phone]  Fax: [phone]  Email: [email or web address]   DISCHARGE SUPPORT TEAM:  For Patients Discharge Needs & Updates  Phone: [phone] opt. 2 Fax: [phone]  Email: " [email or web address]

## 2024-07-29 NOTE — PATIENT INSTRUCTIONS
Schedule ultrasound of lower extremities  Take Motrin and use warm heat for pain  Call with needs or concerns  Schedule postpartum visit in 3 weeks

## 2024-07-30 ENCOUNTER — HOSPITAL ENCOUNTER (OUTPATIENT)
Dept: NON INVASIVE DIAGNOSTICS | Facility: CLINIC | Age: 19
Discharge: HOME | End: 2024-07-30
Payer: COMMERCIAL

## 2024-07-30 DIAGNOSIS — M79.651 RIGHT THIGH PAIN: ICD-10-CM

## 2024-07-30 PROCEDURE — 93971 EXTREMITY STUDY: CPT

## 2024-07-30 PROCEDURE — 93971 EXTREMITY STUDY: CPT | Performed by: SURGERY

## 2024-07-31 ENCOUNTER — TELEPHONE (OUTPATIENT)
Dept: OBGYN CLINIC | Facility: CLINIC | Age: 19
End: 2024-07-31

## 2024-07-31 NOTE — TELEPHONE ENCOUNTER
Tried to call Kay to explain lower extremity venous doppler studies were WNL. Had to leave a message.

## 2024-08-01 ENCOUNTER — PATIENT OUTREACH (OUTPATIENT)
Dept: OBGYN CLINIC | Facility: CLINIC | Age: 19
End: 2024-08-01

## 2024-08-01 NOTE — PROGRESS NOTES
DOROTHY VAZQUEZ reviewed that pt recently gave birth. DOROTHY VAZQUEZ called pt to check in and see if she is in need of any resources post partum, there was no answer, DOROTHY VAZQUEZ left  for a return call. Pt PP visit 08/20

## 2024-08-02 LAB — PLACENTA IN STORAGE: NORMAL

## 2024-08-12 ENCOUNTER — OFFICE VISIT (OUTPATIENT)
Dept: POSTPARTUM | Facility: CLINIC | Age: 19
End: 2024-08-12
Payer: COMMERCIAL

## 2024-08-12 VITALS — DIASTOLIC BLOOD PRESSURE: 84 MMHG | HEART RATE: 80 BPM | SYSTOLIC BLOOD PRESSURE: 104 MMHG

## 2024-08-12 PROCEDURE — 99211 OFF/OP EST MAY X REQ PHY/QHP: CPT | Performed by: PEDIATRICS

## 2024-08-12 NOTE — PATIENT INSTRUCTIONS
"Continue to feed on demand (at least 8-12 times in 24 hours) working on achieving an optimal latch by positioning baby with ear, shoulder and hip in line, baby's arms open, not across chest/ in between mom and baby.  Starting with nose to nipple, hand at base if baby's head/neck infant up at chest level and starting to feed infant with nose arriving at the nipple. Then, using areolar compression to achieve a deep latch that is comfortable and exchanges optimum amounts of milk.      When baby slows at the breast you may offer gentle breast compressions to increase flow.  Offer both breasts with each feeding.  You may offer up to 4 breasts per feeding, or \"switch\" nursing: latch baby, when suckling slows offer gentle breast compressions, once no longer actively nursing on that breast burp to stimulate, then switch to the other side, repeat up to 2 more times as needed.       Continue to supplement as desired, be sure to pump to protect milk supply when supplementing.    When giving bottles be sure to do so by paced bottle feeding with a slow flow nipple, refer to the hand out and IABLE video.    Refer to the hands on pumping video and hand express after pumping.  You may cycle the pump from let down mode to expression once milk flow increases, once flow decreases you may go back to let down mode and go though the cycle again, up to 3 times in a pumping session.  Sessions should last no longer than 20 min. Check flange sizes and consider adjusting if needed.  The nipple should move freely in and out of the flange comfortably.  Adjust the settings on the pump as needed, higher suction does not equal more milk, comfort is important to promote milk flow.    Follow up in 2 weeks.    Call with any questions or concerns.  "

## 2024-08-12 NOTE — PROGRESS NOTES
INITIAL BREAST FEEDING EVALUATION    Informant/Relationship: Kay    Discussion of General Lactation Issues: Kay feels like breastfeeding is stressful, she feels like it is too time consuming and she does not have enough time for herself.     Infant is 2 weeks  old today.        History:  Fertility Problem:no  Breast changes:none  : yes - spontaneous per mom no epidural per mom   Full term:yes - 39.2   labor:no  First nursing/attempt < 1 hour after birth:yes - yes breastfeed   Skin to skin following delivery:yes - immediatly for  1hr  Breast changes after delivery:yes - leaking milk, milk in 2 hours after delivery per Kay   Rooming in (infant in room with mother with exception of procedures, eg. Circumcision: yes - yes  Blood sugar issues:no  NICU stay:no  Jaundice:no  Phototherapy:no  Supplement given: (list supplement and method used as well as reason(s):yes - formula, in the hospital, so mom could rest    Past Medical History:   Diagnosis Date    Anxiety     Depression     Scoliosis          Current Outpatient Medications:     acetaminophen (TYLENOL) 325 mg tablet, Take 2 tablets (650 mg total) by mouth every 6 (six) hours as needed for mild pain, Disp: , Rfl:     benzocaine-menthol-lanolin-aloe (DERMOPLAST) 20-0.5 % topical spray, Apply 1 Application topically every 6 (six) hours as needed for mild pain or irritation, Disp: , Rfl:     hydrocortisone 1 % cream, Apply 1 Application topically daily as needed for irritation (Patient not taking: Reported on 2024), Disp: , Rfl:     ibuprofen (MOTRIN) 600 mg tablet, Take 1 tablet (600 mg total) by mouth every 6 (six) hours as needed for mild pain (Patient not taking: Reported on 2024), Disp: , Rfl:     Prenatal Vit-Fe Fumarate-FA (Prenatal Plus Vitamin/Mineral) 27-1 MG TABS, Take 1 tablet by mouth in the morning, Disp: 90 tablet, Rfl: 4    witch hazel-glycerin (TUCKS) topical pad, Apply 1 Pad topically every 4 (four) hours as needed  for irritation, Disp: , Rfl:     No Known Allergies    Social History     Substance and Sexual Activity   Drug Use Never       Social History     Interval Breastfeeding History:    Frequency of breast feeding: q1.5-2hrs   Does mother feel breastfeeding is effective: If no, explain: feels like baby feed to often   Does infant appear satisfied after nursing:Yes  Stooling pattern normal: lYes  Urinating frequently:Yes  Using shield or shells: No    Alternative/Artificial Feedings:   Bottle: Yes, not sure if its Slow flow nipple, not paced bottle feeding   Cup: No  Syringe/Finger: No           Formula Type: Similac                      Amount: 2oz             Breast Milk:                      Amount: 3oz             Baby receives 1 formula bottle a day and 2-3 bottles of breast milk   Elimination Problems: No      Equipment:    Pump            Type: Zomee Z2            Frequency of Use: 3 times a day yields 5oz total from both breasts, about 4oz is from the right breast       Equipment Problems: no    Mom:  Breast: Normal, medium size, round, close spaced   Nipple Assessment in General: Normal: elongated/eraser, no discoloration and no damage noted.  Mother's Awareness of Feeding Cues                 Recognizes: Yes                  Verbalizes: Yes  Support System: Extended family   History of Breastfeeding: none   Changes/Stressors/Violence: the time it takes to feed the baby, not alone for DV  Concerns/Goals: Desires to continue to breastfeed both by pumping/bottle feeding and directly at the breast. She prefers not to use formula.    Problems with Mom: feels breastfeeding is too time consuming    Physical Exam  Constitutional:       Appearance: Normal appearance.   HENT:      Head: Normocephalic.   Cardiovascular:      Rate and Rhythm: Normal rate and regular rhythm.      Pulses: Normal pulses.      Heart sounds: Normal heart sounds.   Pulmonary:      Effort: Pulmonary effort is normal.      Breath sounds: Normal  breath sounds.   Musculoskeletal:         General: Normal range of motion.      Cervical back: Normal range of motion.   Neurological:      General: No focal deficit present.      Mental Status: She is alert and oriented to person, place, and time.   Skin:     General: Skin is warm and dry.   Psychiatric:         Mood and Affect: Mood normal.         Behavior: Behavior normal.         Thought Content: Thought content normal.         Judgment: Judgment normal.         Infant:  Behaviors: Alert  Color: Pink  Birth weight: 2860g  Current weight: 3120g    Problems with infant: shallow latch, occasional clicking.  Appears to have a preference to hold head to the left.      General Appearance:  Alert, active, no distress                            Head:  Normocephalic, AFOF, sutures opposed                            Eyes:   Conjunctiva clear, no drainage                            Ears:   Normally placed, no anomolies                           Nose:   Septum intact, no drainage or erythema                          Mouth:  No lesions, tongue is round, extends to lower lip, lateralizes with body, complete spread, peristalsis, firm cup with no snap back on examiner's finger,  lingual frenulum attaches posterior to tip of tongue and to floor or mouth below lower alveolar ridge with moderate elasticity and less than 1cm lift                   Neck:  Supple, symmetrical                Respiratory:  No grunting, flaring, retractions, breath sounds clear and equal           Cardiovascular:  Regular rate and rhythm. No murmur. Adequate perfusion/capillary refill. Femoral pulse present                  Abdomen:    Soft, non-tender, no masses            Genitourinary:  Normal female genitalia, anus patent                         Spine:   No abnormalities noted       Musculoskeletal:   Full range of motion         Skin/Hair/Nails:   Skin warm, dry, and intact, no rashes or abnormal dyspigmentation or lesions                Neurologic:   No abnormal movement, tone appropriate for gestational age     Latch:  Efficiency:               Lips Flanged: Yes              Depth of latch: shallow              Audible Swallow: Yes              Visible Milk: Yes              Wide Open/ Asymmetrical: Yes              Suck Swallow Cycle: Breathing: unlabored, Coordinated: yes  Nipple Assessment after latch: slightly creased   Latch Problems:   initially latch is shallow and baby pops off easily, with re-positioning and sandwiching of the breast a slightly deeper latch is achieved ad suck/swallow pattern is maintained.  A few clicked noted.    Position:  Infant's Ergonomics/Body               Body Alignment: Yes               Head Supported: Yes               Close to Mom's body/ Lifted/ Supported: Yes               Mom's Ergonomics/Body: Yes                           Supported: Yes                           Sitting Back: Yes                           Brings Baby to her breast: non  Positioning Problems: Initially Kay holds baby in front of breast, slightly away from her own body offering no support of the breast, latch is shallow and baby pops off easily, with re-positioning and sandwiching of the breast a slightly deeper latch is achieved ad suck/swallow pattern is maintained.       Handouts:   Paced bottle feeding, Hands on pumping, Hand expression, Latch Check List, and breast compressions    Education:  Reviewed Latch and positioning: Worked on positioning infant up at chest level and starting to feed infant with nose arriving at the nipple. Then, using areolar compression to achieve a deep latch that is comfortable and exchanges optimum amounts of milk. I offered suggestions on positioning, for a more optimal latch, showed mom proper positioning, ear, shoulder hip in line, baby's arms open, not in between mom and baby, nose to nipple, hand at base of head/neck.  Reviewed Frequency/Supply & Demand: Continue to feed baby on demand at  least 8-12 times in 24 hours, offering breast compressions and switch nursing as needed.   May supplement as desired being sure to pump at the time to feed baby/protect milk supply. Encouraged 8-12 milk removals in 24 hours, ideally q2-3hrs during the day and at least 1-2 times over night not going longer than 5hrs between nighttime milk removals.  How to break a latch with clean finger.  How to differentiate between nutritive and non-nutritive suck.  Reviewed Infant:Cues and varied States of Awareness  Reviewed Infant Elimination: yes  Reviewed Alternative/Artificial Feedings: paced bottle feeding with slow flow nipple   Reviewed Mom/Breast care: Discussed appropriate handling and care of the breast to maintain their integrity.  Reviewed Equipment: Refer to the hands on pumping video and hand express after pumping.  You may cycle the pump from let down mode to expression once milk flow increases, once flow decreases you may go back to let down mode and go though the cycle again, up to 3 times in a pumping session.  Sessions should last no longer than 20 min. Check flange sizes and consider adjusting if needed.  The nipple should move freely in and out of the flange comfortably.  Adjust the settings on the pump as needed, higher suction does not equal more milk, comfort is important to promote milk flow.      Plan:  Continue to feed Eloisa on demand at least 8-12 times in 24 hours working on optimal latch and positioning, offering breast compressions and switch nursing as needed.     Continue to supplement as desired, being sure to pump to maintain milk supply.  Implement hands on pumping and hand expression.     Follow up with OB regarding vaginal pain.     Encouraged tummy time for Eloisa, may consider PT eval if left sided head preference is noted on follow up.     I have spent 70 minutes with Patient and family today in which greater than 50% of this time was spent in counseling/coordination of care  regarding Patient and family education.

## 2024-08-18 NOTE — PROGRESS NOTES
I have reviewed the notes, assessments, and/or procedures performed by Shanae Miranda RN, IBCLC, I concur with her/his documentation of Kay Valladares MD 08/17/24

## 2024-08-20 ENCOUNTER — POSTPARTUM VISIT (OUTPATIENT)
Dept: OBGYN CLINIC | Facility: CLINIC | Age: 19
End: 2024-08-20

## 2024-08-20 VITALS
HEIGHT: 63 IN | SYSTOLIC BLOOD PRESSURE: 125 MMHG | WEIGHT: 120 LBS | BODY MASS INDEX: 21.26 KG/M2 | HEART RATE: 80 BPM | DIASTOLIC BLOOD PRESSURE: 80 MMHG

## 2024-08-20 DIAGNOSIS — Z30.013 ENCOUNTER FOR INITIAL PRESCRIPTION OF INJECTABLE CONTRACEPTIVE: ICD-10-CM

## 2024-08-20 DIAGNOSIS — Z30.09 GENERAL COUNSELING AND ADVICE ON FEMALE CONTRACEPTION: ICD-10-CM

## 2024-08-20 PROBLEM — Z3A.39 39 WEEKS GESTATION OF PREGNANCY: Status: RESOLVED | Noted: 2024-03-27 | Resolved: 2024-08-20

## 2024-08-20 LAB — SL AMB POCT URINE HCG: NEGATIVE

## 2024-08-20 PROCEDURE — 96372 THER/PROPH/DIAG INJ SC/IM: CPT | Performed by: NURSE PRACTITIONER

## 2024-08-20 PROCEDURE — 81025 URINE PREGNANCY TEST: CPT | Performed by: NURSE PRACTITIONER

## 2024-08-20 PROCEDURE — 99214 OFFICE O/P EST MOD 30 MIN: CPT | Performed by: NURSE PRACTITIONER

## 2024-08-20 RX ORDER — MEDROXYPROGESTERONE ACETATE 150 MG/ML
150 INJECTION, SUSPENSION INTRAMUSCULAR ONCE
Status: COMPLETED | OUTPATIENT
Start: 2024-08-20 | End: 2024-08-20

## 2024-08-20 RX ORDER — MEDROXYPROGESTERONE ACETATE 150 MG/ML
150 INJECTION, SUSPENSION INTRAMUSCULAR
Qty: 1 ML | Refills: 5 | Status: SHIPPED | OUTPATIENT
Start: 2024-08-20

## 2024-08-20 RX ADMIN — MEDROXYPROGESTERONE ACETATE 150 MG: 150 INJECTION, SUSPENSION INTRAMUSCULAR at 14:52

## 2024-08-20 NOTE — PROGRESS NOTES
"OB POSTPARTUM VISIT PROGRESS NOTE  Date of Encounter: 2024    Kay Barclay    : 2005  (19 y.o.)  MR: [id number]    Subjective   Kay is in for her postpartum visit. She is now . She delivered by normal spontaneous vaginal delivery. She's generally doing well, denies current pain or bleeding issues, and has no significant depression issues.[place] score was 6, pt is assisted by her mother with her baby.  She is breast feeding exclusively. Discussed ways to increase milk supply. We discussed all appropriate contraceptive options and she chooses Pt woulds like to start Depoprovera today. Safe and effective use of Depoprovera provided  Denies problems with urinating or BM's  Pt is aware exercise and intercourse should not resume until 6 weeks postpartum      LABS:  Last   negative GC/Chlamydia was 2024  First PAP is due after 2025    Objective   EXAM:  GENERAL: alert, well appearing, and in no distress.  VITALS: Blood pressure 125/80, pulse 80, height 5' 3\" (1.6 m), weight 54.4 kg (120 lb), last menstrual period 2024, currently breastfeeding.  BMI: Body mass index is 21.26 kg/m².  WNL respiratory effort, negative cough or SOB  BREASTS: Denies pain, redness or lumps  EXTREMITIES: Denies pain, redness or jo ann    Assessment & Plan   Diagnoses and all orders for this visit:    Postpartum follow-up     (spontaneous vaginal delivery)    Encounter for initial prescription of injectable contraceptive  -     medroxyPROGESTERone (DEPO-PROVERA) 150 mg/mL injection; Inject 1 mL (150 mg total) into a muscle every 3 (three) months    General counseling and advice on female contraception        Plan  Patient Instructions   Return today for Depoprovera  Call with needs or concerns  Annual GYN exam is due after 2025  Return for Depoprovera injection, today.    RANCHO Brewster    "

## 2024-08-20 NOTE — PROGRESS NOTES
"OB POSTPARTUM VISIT PROGRESS NOTE  Date of Encounter: 2024    Kay Barclay    : 2005  (19 y.o.)  MR: [id number]    Subjective   Kay is in for her postpartum visit. She is now . She delivered by {method of delivery:755022}. She's generally doing well, denies current pain or bleeding issues, and has no significant depression issues. {Breast feedin} We discussed all appropriate contraceptive options and she chooses {DP PP Contraception:41798}.    {Labs annual:00735::\"LABS:\"}    Objective   EXAM:  GENERAL: {appearance:536144::\"alert, well appearing, and in no distress\"}.  VITALS: Blood pressure 125/80, pulse 80, height 5' 3\" (1.6 m), weight 54.4 kg (120 lb), last menstrual period 2024, currently breastfeeding.  BMI: Body mass index is 21.26 kg/m².  BREASTS: {PE Breast:65908::\"Bilaterally nontender, no masses or nipple discharge.\"}  ABDOMEN: {PE Abdomen:56631}  EXTREMITIES: {PE Extremities:17246::\"All normal.\"}    Assessment & Plan   Diagnoses and all orders for this visit:    Postpartum follow-up     (spontaneous vaginal delivery)        Plan  There are no Patient Instructions on file for this visit.  No follow-ups on file.    RANCHO Brewster    "

## 2024-08-20 NOTE — PATIENT INSTRUCTIONS
Return today for Depoprovera  Call with needs or concerns  Annual GYN exam is due after 6//29/2025

## 2024-08-20 NOTE — PROGRESS NOTES
Depo given to patient in right deltoid on 8/20/2024. Pregnancy test negative.    NDC: 77123-721-86  LOT: [id number]  EXP: 11/30/2028

## 2024-08-21 ENCOUNTER — PATIENT OUTREACH (OUTPATIENT)
Dept: OBGYN CLINIC | Facility: CLINIC | Age: 19
End: 2024-08-21

## 2024-08-21 NOTE — PROGRESS NOTES
Reviewed pt chart, pt was in for PP visit yesterday, her [place] was a 6, there were no SW needs expressed at the time of the visit. SW CM will close this encounter.

## 2024-08-30 ENCOUNTER — OFFICE VISIT (OUTPATIENT)
Dept: POSTPARTUM | Facility: CLINIC | Age: 19
End: 2024-08-30
Payer: COMMERCIAL

## 2024-08-30 VITALS — DIASTOLIC BLOOD PRESSURE: 70 MMHG | HEART RATE: 68 BPM | SYSTOLIC BLOOD PRESSURE: 100 MMHG

## 2024-08-30 PROCEDURE — 99211 OFF/OP EST MAY X REQ PHY/QHP: CPT | Performed by: PEDIATRICS

## 2024-08-30 NOTE — PROGRESS NOTES
BREAST FEEDING FOLLOW UP VISIT    Informant/Relationship:  Kay    Discussion of General Lactation Issues: Kay states that breastfeeding is getting better but she notices clicking with every feeding and with the latch is shallow it is painful.      Mom is not noticing a head turn preference.    Infant is 5 weeks  old today.    Interval Breastfeeding History:    Frequency of breast feeding: q2hrs ATC  Does mother feel breastfeeding is effective: Yes  Does infant appear satisfied after nursing:Yes  Stooling pattern normal:Yes  Urinating frequently:Yes  Using shield or shells:No    Alternative/Artificial Feedings:   Bottle: Yes, slow flow nipple, paced bottle feeding   Cup: No  Syringe/Finger: No           Formula Type: none                     Amount: na            Breast Milk:                      Amount: 5oz about 2 bottles a day in addition to feeding at the breast, bottle given after the breast   Elimination Problems: No      Equipment:    Pump            Type: Zomee Z2            Frequency of Use: about 2x times a day, she pumps when Eloisa gets a bottle, she yields 5oz total from both breasts       Equipment Problems: no      Mom:  Breast: Normal, medium size, round, close spaced  Nipple Assessment in General: Normal: elongated/eraser, no discoloration and no damage noted.  Mother's Awareness of Feeding Cues                 Recognizes: Yes                  Verbalizes: Yes  Support System: FOB not involved, good family support   History of Breastfeeding: none   Changes/Stressors/Violence: none, safe at home   Concerns/Goals: Kay has no concerns at this time, she desires to continue to breastfeed and provide her milk exclusively     Problems with Mom: painful latch at times     Physical Exam  Constitutional:       Appearance: Normal appearance.   HENT:      Head: Normocephalic and atraumatic.   Cardiovascular:      Rate and Rhythm: Normal rate and regular rhythm.      Pulses: Normal pulses.      Heart  sounds: Normal heart sounds.   Pulmonary:      Effort: Pulmonary effort is normal.      Breath sounds: Normal breath sounds.   Musculoskeletal:         General: Normal range of motion.      Cervical back: Normal range of motion.   Neurological:      General: No focal deficit present.      Mental Status: She is alert and oriented to person, place, and time.   Skin:     General: Skin is warm and dry.   Psychiatric:         Mood and Affect: Mood normal.         Behavior: Behavior normal.         Thought Content: Thought content normal.         Judgment: Judgment normal.       Infant:  Behaviors: Alert  Color: Pink  Birth weight: 2860g  Current weight: 3720g    Problems with infant: restricted tongue movement       General Appearance:  Alert, active, no distress                            Head:  Normocephalic, AFOF, sutures opposed                            Eyes:   Conjunctiva clear, no drainage                            Ears:   Normally placed, no anomolies                           Nose:   Septum intact, no drainage or erythema                          Mouth:  No lesions, suck blisters along lips, tongue extends to lower lip, lateralization is almost complete, Eloisa struggles to achieve a latch on examiners finger, when she does it is brief with moderate cup and sometimes partial peristalsis with very briefs periods of complete peristalsis with frequent snap back.  Lingual frenulum attaches posterior to tip of tongue and just below lower alveolar ridge with greater than 1cm lift and good elasticity                    Neck:  Supple, symmetrical                Respiratory:  No grunting, flaring, retractions, breath sounds clear and equal           Cardiovascular:  Regular rate and rhythm. No murmur. Adequate perfusion/capillary refill. Femoral pulse present                  Abdomen:    Soft, non-tender, no masses, bowel sounds present            Genitourinary:  Normal female genitalia, anus patent                    "      Spine:   No abnormalities noted       Musculoskeletal:   Full range of motion         Skin/Hair/Nails:   Skin warm, dry, and intact, no  abnormal dyspigmentation or lesions, mild rash               Neurologic:   No abnormal movement, tone appropriate for gestational age    [place] Latch:  Efficiency:               Lips Flanged: Yes              Depth of latch: moderate to shallow              Audible Swallow: Yes              Visible Milk: Yes              Wide Open/ Asymmetrical: Yes              Suck Swallow Cycle: Breathing: unlabored, Coordinated: yes  Nipple Assessment after latch: Normal: elongated/eraser, no discoloration and no damage noted.  Latch Problems: initially latch is shallow and painful, with re-latch and repositioning a deeper latch is achieved, she sustains a moderate latch with good suck/swallow/breath for a period of time, then latch becomes shallow and she begins to \"click\" and latch becomes painful, Kay unlatches baby, a deeper comfortable latch is achieved and once again after a period of time becomes shallow, painful and clicking is noted.    Position:  Infant's Ergonomics/Body               Body Alignment: Yes               Head Supported: Yes               Close to Mom's body/ Lifted/ Supported: Yes               Mom's Ergonomics/Body: Yes                           Supported: Yes                           Sitting Back: Yes                           Brings Baby to her breast: Yes  Positioning Problems: Initially Kay places Eloisa on the nursing pillow in front of the breast and waits for her to latch, she immediatly takes to the breast but latch is very shallow, improved with re-latch and positioning.  On the second breast, Eloisa is turned away from mom's body with her arm across her chest in between her and mom, corrected after education.       Education:  Reviewed Latch and positioning: Worked on positioning infant up at chest level and starting to feed infant with nose " arriving at the nipple. Then, using areolar compression to achieve a deep latch that is comfortable and exchanges optimum amounts of milk. I offered suggestions on positioning, for a more optimal latch, showed mom proper positioning, ear, shoulder hip in line, baby's arms open, not in between mom and baby, nose to nipple, hand at base of head/neck.How to break a latch with clean finger.  How to differentiate between nutritive and non-nutritive suck.  Reviewed Frequency/Supply & Demand: continue to feed on demand, at least  8-12 times in 24 hours   Reviewed Infant:Cues and varied States of Awareness  Reviewed Infant Elimination: yes  Reviewed Alternative/Artificial Feedings: paced bottle feeding with slow flow nipple   Reviewed Mom/Breast care: Discussed appropriate handling and care of the breast to maintain their integrity.Discussed appropriate handling and care of the breast to maintain their integrity.        Plan:  Continue to feed on demand at least 8-12 times in 24 hours working on optimal latch and positioning, re-latching as needed for a deeper latch and offering breast compressions and switch nursing as needed.     Continue to pump and supplement as desired.    Schedule and appt  with Dr. Valladares.      Follow up with lactation as needed.    Call with any questions or concerns.    I have spent 75 minutes with Patient and family today in which greater than 50% of this time was spent in counseling/coordination of care regarding Patient and family education.

## 2024-08-30 NOTE — PATIENT INSTRUCTIONS
"Continue to feed on demand (at least 8-12 times in 24 hours) working on achieving an optimal latch by positioning baby with ear, shoulder and hip in line, baby's arms open, not across chest/ in between mom and baby.  Starting with nose to nipple, hand at base if baby's head/neck infant up at chest level and starting to feed infant with nose arriving at the nipple. Then, using areolar compression to achieve a deep latch that is comfortable and exchanges optimum amounts of milk.      When baby slows at the breast you may offer gentle breast compressions to increase flow.  Offer both breasts with each feeding.  You may offer up to 4 breasts per feeding, or \"switch\" nursing: latch baby, when suckling slows offer gentle breast compressions, once no longer actively nursing on that breast burp to stimulate, then switch to the other side, repeat up to 2 more times as needed.       When giving bottles be sure to do so by paced bottle feeding with a slow flow nipple, pump when bottle feeding to protect milk supply/feed baby     Follow with Dr. Valladares.      Follow up with lactation as needed.    Call with any questions or concerns.  "

## 2024-09-01 NOTE — PROGRESS NOTES
I have reviewed the notes, assessments, and/or procedures performed by Shanae Miranda RN, IBCLC, I concur with her/his documentation of Kay Valladares MD 09/01/24

## 2024-09-12 ENCOUNTER — OFFICE VISIT (OUTPATIENT)
Dept: POSTPARTUM | Facility: CLINIC | Age: 19
End: 2024-09-12
Payer: COMMERCIAL

## 2024-09-12 PROCEDURE — 99211 OFF/OP EST MAY X REQ PHY/QHP: CPT | Performed by: PEDIATRICS

## 2024-09-12 NOTE — PROGRESS NOTES
I have reviewed the notes, assessments, and/or procedures performed by Thi Norwood RN, IBCLC, I concur with her/his documentation of Kay Valladares MD 09/12/24

## 2024-09-12 NOTE — PROGRESS NOTES
BREAST FEEDING FOLLOW UP VISIT    Informant/Relationship: Kay    Discussion of General Lactation Issues: Kay is still having pain with some feedings, sometimes Eloisa has trouble latching.     Infant is 7 weeks old today.    Interval Breastfeeding History:  Frequency of breast feeding: On demand every 2 hours  Does mother feel breastfeeding is effective: Yes, but sometimes latch is painful and sometimes Eloisa does not latch  Does infant appear satisfied after nursing:Yes  Stooling pattern normal:Yes  Urinating frequently:Yes  Using shield or shells:No    Alternative/Artificial Feedings:   Bottle: Yes, typically about 3 times a day.  Cup: No  Syringe/Finger: No           Formula Type: none                     Amount: n/a            Breast Milk:                      Amount: 3 ounces            Frequency Q 2 Hr between feedings  Elimination Problems: No      Equipment:  Nipple Shield             Type: none             Size: n/a             Frequency of Use: n/a  Pump            Type: Zomee Z2            Frequency of Use: 3-4 times a day.  Expresses up to 3 ounces per session.  Shells            Type: none            Frequency of use: n/a    Equipment Problems: no      Mom:  Breast: Medium sized symmetrical breasts.  Closely spaced.  Nipple Assessment in General: Normal: elongated/eraser, no discoloration and no damage noted.  Mother's Awareness of Feeding Cues                 Recognizes: Yes                  Verbalizes: Yes  Support System: FOB is not involved.  Good family support  History of Breastfeeding: none  Changes/Stressors/Violence: Kay is concerned that sometimes feedings are painful and sometimes Eloisa will not latch  Concerns/Goals: Kay desires to feed without pain and to  feel more confident with positioning for feedings.    Problems with Mom: attachment associated pain    Physical Exam  Constitutional:       Appearance: Normal appearance.   HENT:      Head: Normocephalic and  atraumatic.   Pulmonary:      Effort: Pulmonary effort is normal.   Musculoskeletal:         General: Normal range of motion.      Cervical back: Normal range of motion.   Neurological:      Mental Status: She is alert and oriented to person, place, and time.   Skin:     General: Skin is warm and dry.   Psychiatric:         Mood and Affect: Mood normal.         Behavior: Behavior normal.         Thought Content: Thought content normal.         Judgment: Judgment normal.         Infant:  Behaviors: Alert  Color: Normal  Birth weight: 2860grams  Current weight: 4320 grams    Problems with infant: restricted tongue movement      General Appearance:  Alert, active, no distress                            Head:  Normocephalic, AFOF, sutures opposed                            Eyes:   Conjunctiva clear, no drainage                            Ears:   Normally placed, no anomolies                           Nose:   No drainage or erythema                          Mouth:  No lesions. High narrow palate. 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  The tongue extends beyond the lower lip,  good lateralization observed but passive lift of the tongue led to the lower jaw being pulled up and the mouth closing, making inspection under the tongue difficult. The lingual frenulum is broad, attached posterior to the base of the tongue and along the base of the lower alveolar ridge. No cupping or peristalsis felt during this exam.  The attempt to suck was very disorganized and dysfunctional                   Neck:  Preference to tilt her left ear to her left shoulder with her chin pointed at her right shoulder, symmetrical, trachea midline                Respiratory:  No grunting, flaring, retractions, breath sounds clear and equal           Cardiovascular:  Regular rate and rhythm. No murmur. Adequate perfusion/capillary refill.                   Abdomen:    Soft, non-tender, no masses, bowel sounds present, no HSM             Genitourinary:  Normal female genitalia, anus patent                         Spine:   No abnormalities noted       Musculoskeletal:   Full range of motion         Skin/Hair/Nails:   Skin warm, dry, and intact, resolving baby acne noted on the face, neck and upper chest               Neurologic:   No abnormal movement, tone appropriate for gestational age     Latch:  Efficiency:               Lips Flanged: the upper lip did not flange              Depth of latch: shallow initially.  Deep after repositioning but she clicked frequently as she sucked              Audible Swallow: Yes, sustained SSB.  Frequent fasciculation of the lower jaw observed throughout the entire feeding              Visible Milk: Yes              Wide Open/ Asymmetrical: Yes, after repositioning              Suck Swallow Cycle: Breathing: unlabored, Coordinated: yes  Nipple Assessment after latch: Normal: elongated/eraser, no discoloration and no damage noted.  Latch Problems: Kay needed support with positioning.  After repositioning a deeper latch was achieved and Kay reported that the latch was completely comfortable but Eloisa clicked frequently as she fed and frequent fasciculation of the lower jaw was observed through the entire feeding.    Position:  Infant's Ergonomics/Body               Body Alignment: Yes               Head Supported: Yes               Close to Mom's body/ Lifted/ Supported: Yes               Mom's Ergonomics/Body: Yes                           Supported: Yes                           Sitting Back: Yes                           Brings Baby to her breast: Yes  Positioning Problems: Initially, Kay held Eloisa's head in her hand and her bottom in the other and positioned her with her chin pointed down to her chest with the nipple at her chin.  Eloisa just sucked the nipple into her mouth.   Eloisa was challenging to position. She keeps her arms flexed tightly against her chest and her legs flexed against  her abdomen.  Positioning her so that her head tipped back slightly bringing her to the breast chin first was difficult but eventually achieved.      Handouts:   None    Education:  Reviewed Latch: Demonstrated how to gently compress the breast and align the baby so that her nose is just above the nipple with her lower lip and chin touching the breast to encourage the deepest, widest, off-center latch.   Reviewed Positioning for Dyad: Demonstrated how to position mom comfortably and supported with her baby belly to belly prior to bringing her baby to her breast        Plan:    I encouraged Kay to continue to feed Eloisa on demand. We worked on positioning to improve her comfort and confidence.  I recommended that she continue pumping when a feeding at the breast is missed.  Kay and Eloisa have a follow up appointment scheduled with Dr Valladares for more evaluation of Eloisa's feeding challenges.  I encouraged Kay to call with any questions or concerns.      I have spent 60 minutes with Patient and family today in which greater than 50% of this time was spent in counseling/coordination of care regarding Patient and family education and Counseling / Coordination of care.

## 2024-09-12 NOTE — PATIENT INSTRUCTIONS
Continue to nurse Eloisa as often as you desire.  Gently compress the breast and align the baby so that her nose is just above the nipple with her lower lip and chin touching the breast to encourage the deepest, widest, off-center latch.   Pump when a feeding at the breast is missed.  Follow up with Dr Valladares as scheduled.  Please call with any questions or concerns.

## 2024-09-25 ENCOUNTER — OFFICE VISIT (OUTPATIENT)
Dept: POSTPARTUM | Facility: CLINIC | Age: 19
End: 2024-09-25
Payer: COMMERCIAL

## 2024-09-25 VITALS — DIASTOLIC BLOOD PRESSURE: 72 MMHG | SYSTOLIC BLOOD PRESSURE: 112 MMHG

## 2024-09-25 PROCEDURE — 99215 OFFICE O/P EST HI 40 MIN: CPT | Performed by: PEDIATRICS

## 2024-09-25 NOTE — PROGRESS NOTES
BREAST FEEDING FOLLOW UP VISIT    Informant/Relationship: Kay and her mother/mom and MGM    Discussion of General Lactation Issues: Some feedings at the breast are still painful, and Eloisa clicks at both the breast and the bottle. Eloisa has started seeing speech therapy. Eloisa states that when latch is 1-2/10 most of the time, but not infrequently, her attachment is more like 5/10. When the pain is worse, it lasts for about 3 minutes. She has more pain on the left, but feels that the right looks more flattened.     Infant is 2 month old today.    Interval Breastfeeding History:    Frequency of breast feedin x/day  Does mother feel breastfeeding is effective: Yes  Does infant appear satisfied after nursing:Yes  Stooling pattern normal:Yes  Urinating frequently:Yes  Using shield or shells:No    Alternative/Artificial Feedings:   Bottle: Yes, 2-3 times/day, family is unfamiliar with paced bottle feeding  Cup: No  Syringe/Finger: No           Formula Type: n/a                     Amount: n/a            Breast Milk:                      Amount: 3 oz            Frequency Q 2 Hr between feedings  Elimination Problems: No      Equipment:  Nipple Shield             Type: n/a             Size: n/a             Frequency of Use: n/a  Pump            Type: Zomee Z2            Frequency of Use: 3-4 times/day; expresses 3 oz each time  Shells            Type: n/a            Frequency of use: n/a    Equipment Problems: no      Mom:  Breast: Normal  Nipple Assessment in General: Normal: elongated/eraser, no discoloration and no damage noted.  Mother's Awareness of Feeding Cues                 Recognizes: Yes                  Verbalizes: Yes  Support System: good support form   Kay's family; FOB  History of Breastfeeding: none  Changes/Stressors/Violence: feeding's remain painful; there are times when it's harder to get Eloisa to attach  Concerns/Goals: Kay wishes to resolve her pain and feed Eloisa at the  breast. She currently does not have other employment and will be available to exclusively feed at the breast with some bottles    Problems with Mom: Attachment pain    Physical Exam  Constitutional:       Appearance: Normal appearance. She is well-developed and normal weight.   HENT:      Head: Normocephalic and atraumatic.   Eyes:      Extraocular Movements: Extraocular movements intact.   Neck:      Thyroid: No thyromegaly.   Cardiovascular:      Rate and Rhythm: Normal rate and regular rhythm.      Pulses: Normal pulses.      Heart sounds: Normal heart sounds. No murmur heard.  Pulmonary:      Effort: Pulmonary effort is normal.      Breath sounds: Normal breath sounds.   Musculoskeletal:      Cervical back: Normal range of motion and neck supple.   Lymphadenopathy:      Cervical: No cervical adenopathy.      Upper Body:      Right upper body: No pectoral adenopathy.      Left upper body: No pectoral adenopathy.   Neurological:      General: No focal deficit present.      Mental Status: She is alert and oriented to person, place, and time.   Psychiatric:         Mood and Affect: Mood normal.         Behavior: Behavior normal.         Thought Content: Thought content normal.         Judgment: Judgment normal.   Vitals and nursing note reviewed.         Infant:  Behaviors: Alert  Color: Healthy  Birth weight: 2.860 kg  Current weight: 4.655 kg    Problems with infant: Restricted tongue movement      General Appearance:  Alert, active, no distress                            Head:  Normocephalic, AFOF, sutures opposed                            Eyes:   Conjunctiva clear, no drainage                            Ears:   Normally placed, no anomolies                           Nose:   Septum intact, no drainage or erythema                          Mouth:  No lesions; tongue extends just over the lower lip and lateralizes well, but only the edges lift forming a slight bowl when crying; there is little cupping or  peristalsis noted and the seal is not maintained with traction placed on the mandible; lips purse to suck on examiner's finger; the lingual frenulum is broad and inserts to the tongue leaving about 1/3 of the tongue blade free and widely along the lower 1/3 of the inferior alveolar ridge with a slight steepling centrally                   Neck:  Supple, symmetrical, trachea midline, no adenopathy; thyroid: no enlargement, symmetric, no tenderness/mass/nodules; there is a slight preference to turn her head to the left and resistance to passive rotation to the right                Respiratory:  No grunting, flaring, retractions, breath sounds clear and equal           Cardiovascular:  Regular rate and rhythm. No murmur. Adequate perfusion/capillary refill. Femoral pulse present                  Abdomen:    Soft, non-tender, no masses, bowel sounds present, no HSM            Genitourinary:  Normal female genitalia, anus patent                         Spine:   No abnormalities noted       Musculoskeletal:   Full range of motion         Skin/Hair/Nails:   Skin warm, dry, and intact, no rashes or abnormal dyspigmentation or lesions               Neurologic:   No abnormal movement, tone appropriate for gestational age     Latch:  Efficiency:               Lips Flanged: Lower lip flanges well, upper lip is more neutral but not curled in               Depth of latch: Good              Audible Swallow: Yes, sustained SSB              Visible Milk: Yes              Wide Open/ Asymmetrical: Yes              Suck Swallow Cycle: Breathing: Unlabored, Coordinated: Yes  Nipple Assessment after latch: Normal: elongated/eraser, no discoloration and no damage noted.  Latch Problems: With assistance and reminders to compress the breast gently with her fingers and thumb in parallel with Eloisa's lips, Kay is able to assist Eloisa to a more comfortable attachment where the baby attains a sustained and effective SSB and  breastfeeds until content.    Position:  Infant's Ergonomics/Body               Body Alignment: Yes               Head Supported: Yes               Close to Mom's body/ Lifted/ Supported: Initially, but Kay eventually ends up leaning over Eloisa who is lying in her lap               Mom's Ergonomics/Body: Yes                           Supported: Yes                           Sitting Back: No, Kay begins sitting up but eventually bends over Eloisa lying in her lap                           Brings Baby to her breast: Yes, but eventually she leans over Eloisa who is lying in her lap  Positioning Problems: Kay demonstrates comfort in positioning Eloisa at the breast, but ends up leaning over her positioned on her back lying in Kay's lap.        Education:  Reviewed Latch: Reviewed how to gently compress the breast as if offering a sandwich to facilitate a deeper latch.    Reviewed Positioning for Dyad: Reviewed how to bring baby to the breast so that her lower lip and chin touch the breast with her nose just above the nipple to encourage a wider, more asymmetric latch.   Reviewed Frequency/Supply & Demand: Recommended feeding on demand: when the baby gives hunger cues, when the breasts feel full, every 3 hours during the day and every 5 hours at night counting from the beginning of one feeding to the beginning of the next; whichever comes first.    Reviewed Alternative/Artificial Feedings: Paced bottle feeding  Reviewed Mom/Breast care: Recommended expression breast milk whenever Eloisa is fed at the bottle and as needed for comfort.        Plan:  Discussed history and physical exams with Kay. Support given for her commitment to providing breast milk for her baby. Discussed the findings on the baby's exam consistent with tongue tie and reviewed how this may be the cause of nipple trauma, nipple pain, nipple damage, poor milk transfer, blocked ducts, mastitis, and loss of milk production.  Discussed the science that supports performing the frenotomy to improve latch.   Recommended continuing to breastfeed on demand. Recommended expressing breast milk whenever the baby is fed by bottle and as needed, for comfort. Additional support remains available as needed or desired.     I have spent 55 minutes with Patient and family today in which greater than 50% of this time was spent in counseling/coordination of care regarding Prognosis, Risks and benefits of tx options, Instructions for management, Patient and family education, Importance of tx compliance, Risk factor reductions, Impressions, Counseling / Coordination of care, Documenting in the medical record, Reviewing / ordering tests, medicine, procedures  , and Obtaining or reviewing history  .

## 2024-09-25 NOTE — PATIENT INSTRUCTIONS
Continue to offer the breast as often as you feel comfortable.    Express breast milk whenever Eloisa is fed by bottle and as needed, for comfort.     Additional support remains available as needed or desired.

## 2024-10-16 ENCOUNTER — OFFICE VISIT (OUTPATIENT)
Age: 19
End: 2024-10-16
Payer: COMMERCIAL

## 2024-10-16 VITALS — TEMPERATURE: 97.7 F | WEIGHT: 122.3 LBS | BODY MASS INDEX: 21.67 KG/M2 | HEIGHT: 63 IN

## 2024-10-16 DIAGNOSIS — L70.0 ACNE VULGARIS: Primary | ICD-10-CM

## 2024-10-16 PROCEDURE — 99214 OFFICE O/P EST MOD 30 MIN: CPT | Performed by: REGISTERED NURSE

## 2024-10-16 RX ORDER — CLINDAMYCIN PHOSPHATE 10 UG/ML
LOTION TOPICAL 2 TIMES DAILY
Qty: 60 ML | Refills: 1 | Status: SHIPPED | OUTPATIENT
Start: 2024-10-16

## 2024-10-16 RX ORDER — TRETINOIN 0.25 MG/G
CREAM TOPICAL
Qty: 30 G | Refills: 3 | Status: CANCELLED | OUTPATIENT
Start: 2024-10-16

## 2024-10-16 NOTE — PATIENT INSTRUCTIONS
"ACNE VULGARIS    Physical Exam:  Anatomic Locations Involved: Face and Back     TODAY'S PLAN:     PRESCRIPTION MANAGEMENT:  Several treatment options were discussed including topical retinoids and their side effects.     Skin Hygiene:      Wash affected areas (face, chest, and back) TWICE A DAY with a mild cleanser such as Dove, Cetaphil, or CeraVe.  Use only mild cleansers (hypoallergenic and without fragrances) and fragrance free detergent (not \"unscented\" products which contain a masking agent); we discussed avoiding irritants/fragranced products.  Apply a good oil-free facial moisturizer AT LEAST TWO TIMES A DAY \" such as Cetaphil, CeraVe, or Neutragena.  Minimize the application of oils and cosmetics to the affected skin.  This includes HAIR PRODUCTS such as \"leave in\" conditioners.  Unless the product specifically states that it \"won't cause acne,\" \"won't clog pores,\" and/or \"is non-comedogenic\" then it may actually CAUSE acne.  If you smoke, STOP. Nicotine increases sebum retention and increased scale within the follicles, forming comedones (blackheads and whiteheads).  Abrasive treatments such as dermabrasion and spa facials may aggravate inflammatory acne.  Do NOT scratch or pick your acne bumps.  The evidence that diet directly affects acne remains weak.  However, diet does affect your overall health.  Eat plenty of fresh fruit and vegetables.  Avoid protein or amino acid supplements, particularly if they contain leucine. Consider a low-glycemic, low-protein and low-dairy diet.  Be mindful that certain medications may cause of aggravate acne.  Make sure to tell your Dermatologist if you start a new prescription, nutritional supplement, and/or herbal remedy.  Follow up in 6 months      MORNING Topical Regimen:      Benzoyl Peroxide Wash:  Apply to skin while in shower/bath; gently lather into affected areas; leave on for 2-3 minutes; then, rinse completely.  Clindamycin 1% lotion IN THE MORNING:  After " "gently washing and drying your skin, apply this TOPICAL medication evenly over your entire face, avoiding the eyes and corners of the mouth      EVENING Topical Regimen:      Wash affected areas (face, chest, and back) TWICE A DAY with a mild cleanser such as Dove, Cetaphil, or CeraVe.  Use only mild cleansers (hypoallergenic and without fragrances) and fragrance free detergent (not \"unscented\" products which contain a masking agent); we discussed avoiding irritants/fragranced products.  Tretinoin 0.025% cream AT LEAST 1 HOUR BEFORE BEDTIME:  Evenly spread a SINGLE pea-sized amount of this medication over your entire face, avoiding the eyes and corners of the mouth.  Clindamycin 1% lotion AT LEAST 1 HOUR BEFORE BEDTIME:  Evenly spread a SINGLE pea-sized amount of this medication over your entire face, avoiding the eyes and corners of the mouth.       "

## 2024-10-16 NOTE — PROGRESS NOTES
"[facility] Note     Patient Name: Kay Barclay  Encounter Date: 10/16/2024     Have you been cared for by a [facility] Dermatologist in the last 3 years and, if so, which description applies to you?    Yes.  I have been here within the last 3 years, and my medical history has NOT changed since that time.  I am FEMALE/of child-bearing potential.    REVIEW OF SYSTEMS:  Have you recently had or currently have any of the following? No changes in my recent health.   PAST MEDICAL HISTORY:  Have you personally ever had or currently have any of the following?  If \"YES,\" then please provide more detail. No changes in my medical history.   HISTORY OF IMMUNOSUPPRESSION: Do you have a history of any of the following:  Systemic Immunosuppression such as Diabetes, Biologic or Immunotherapy, Chemotherapy, Organ Transplantation, Bone Marrow Transplantation or Prednisone?  No     Answering \"YES\" requires the addition of the dotphrase \"IMMUNOSUPPRESSED\" as the first diagnosis of the patient's visit.   FAMILY HISTORY:  Any \"first degree relatives\" (parent, brother, sister, or child) with the following?    No changes in my family's known health.   PATIENT EXPERIENCE:    Do you want the Dermatologist to perform a COMPLETE skin exam today including a clinical examination under the \"bra and underwear\" areas?  NO  If necessary, do we have your permission to call and leave a detailed message on your Preferred Phone number that includes your specific medical information?  Yes      No Known Allergies   Current Outpatient Medications:     Prenatal Vit-Fe Fumarate-FA (Prenatal Plus Vitamin/Mineral) 27-1 MG TABS, Take 1 tablet by mouth in the morning, Disp: 90 tablet, Rfl: 4    acetaminophen (TYLENOL) 325 mg tablet, Take 2 tablets (650 mg total) by mouth every 6 (six) hours as needed for mild pain (Patient not taking: Reported on 8/12/2024), Disp: , Rfl:     benzocaine-menthol-lanolin-aloe (DERMOPLAST) 20-0.5 % topical " "spray, Apply 1 Application topically every 6 (six) hours as needed for mild pain or irritation (Patient not taking: Reported on 8/20/2024), Disp: , Rfl:     hydrocortisone 1 % cream, Apply 1 Application topically daily as needed for irritation (Patient not taking: Reported on 7/29/2024), Disp: , Rfl:     ibuprofen (MOTRIN) 600 mg tablet, Take 1 tablet (600 mg total) by mouth every 6 (six) hours as needed for mild pain (Patient not taking: Reported on 7/29/2024), Disp: , Rfl:     medroxyPROGESTERone (DEPO-PROVERA) 150 mg/mL injection, Inject 1 mL (150 mg total) into a muscle every 3 (three) months (Patient not taking: Reported on 10/16/2024), Disp: 1 mL, Rfl: 5    witch hazel-glycerin (TUCKS) topical pad, Apply 1 Pad topically every 4 (four) hours as needed for irritation (Patient not taking: Reported on 8/20/2024), Disp: , Rfl:           Whom besides the patient is providing clinical information about today's encounter?   NO ADDITIONAL HISTORIAN (patient alone provided history)    Physical Exam and Assessment/Plan by Diagnosis:    ACNE VULGARIS    Physical Exam:  Anatomic Locations Involved: Face and Back  Global Assessment: ALMOST CLEAR: A few scattered comedones and a few small inflammatory papules.   Scarring Present? NONE  Pertinent Positives:  Pertinent Negatives:    Additional History of Present Condition:  Patient would like to restart topical medication.       TODAY'S PLAN:     PRESCRIPTION MANAGEMENT:  Several treatment options were discussed including topical retinoids and their side effects.     Skin Hygiene:      Wash affected areas (face, chest, and back) TWICE A DAY with a mild cleanser such as Dove, Cetaphil, or CeraVe.  Use only mild cleansers (hypoallergenic and without fragrances) and fragrance free detergent (not \"unscented\" products which contain a masking agent); we discussed avoiding irritants/fragranced products.  Apply a good oil-free facial moisturizer AT LEAST TWO TIMES A DAY \" such as " "Cetaphil, CeraVe, or Neutragena.  Minimize the application of oils and cosmetics to the affected skin.  This includes HAIR PRODUCTS such as \"leave in\" conditioners.  Unless the product specifically states that it \"won't cause acne,\" \"won't clog pores,\" and/or \"is non-comedogenic\" then it may actually CAUSE acne.  If you smoke, STOP. Nicotine increases sebum retention and increased scale within the follicles, forming comedones (blackheads and whiteheads).  Abrasive treatments such as dermabrasion and spa facials may aggravate inflammatory acne.  Do NOT scratch or pick your acne bumps.  The evidence that diet directly affects acne remains weak.  However, diet does affect your overall health.  Eat plenty of fresh fruit and vegetables.  Avoid protein or amino acid supplements, particularly if they contain leucine. Consider a low-glycemic, low-protein and low-dairy diet.  Be mindful that certain medications may cause of aggravate acne.  Make sure to tell your Dermatologist if you start a new prescription, nutritional supplement, and/or herbal remedy.  Follow up in 6 months      MORNING Topical Regimen:      Benzoyl Peroxide Wash:  Apply to skin while in shower/bath; gently lather into affected areas; leave on for 2-3 minutes; then, rinse completely.  Clindamycin 1% lotion IN THE MORNING:  After gently washing and drying your skin, apply this TOPICAL medication evenly over your entire face, avoiding the eyes and corners of the mouth      EVENING Topical Regimen:      Wash affected areas (face, chest, and back) TWICE A DAY with a mild cleanser such as Dove, Cetaphil, or CeraVe.  Use only mild cleansers (hypoallergenic and without fragrances) and fragrance free detergent (not \"unscented\" products which contain a masking agent); we discussed avoiding irritants/fragranced products.  Initially discussed using tretinoin at night however given she's breastfeeding will not prescribe that. Advised to reach out to us to have that " prescribed whenever she's done breastfeeding.   Clindamycin 1% lotion AT LEAST 1 HOUR BEFORE BEDTIME:  Evenly spread a SINGLE pea-sized amount of this medication over your entire face, avoiding the eyes and corners of the mouth.      SYSTEMIC Strategies:      NONE        MEDICAL DECISION MAKING  Treatment Goal:  Resolution of the CHRONIC condition.       Chronic condition is NOT at treatment goal.  It is progressing along its expected course OR is poorly-controlled.            Scribe Attestation      I,:  Marguerite Doran MA am acting as a scribe while in the presence of the attending physician.:       I,:  Devonte Tatum MD personally performed the services described in this documentation    as scribed in my presence.:

## 2024-11-15 ENCOUNTER — TELEPHONE (OUTPATIENT)
Dept: OBGYN CLINIC | Facility: CLINIC | Age: 19
End: 2024-11-15

## 2024-11-15 NOTE — TELEPHONE ENCOUNTER
Patient  called stated she no longer wants to be on birth control. Asked patient if she wants to make an appointment for a [facility] control consult. Patient declined.

## 2025-01-08 ENCOUNTER — OFFICE VISIT (OUTPATIENT)
Dept: OBGYN CLINIC | Facility: CLINIC | Age: 20
End: 2025-01-08

## 2025-01-08 VITALS
WEIGHT: 116.8 LBS | SYSTOLIC BLOOD PRESSURE: 112 MMHG | BODY MASS INDEX: 20.7 KG/M2 | HEIGHT: 63 IN | DIASTOLIC BLOOD PRESSURE: 75 MMHG | HEART RATE: 87 BPM

## 2025-01-08 DIAGNOSIS — N92.6 IRREGULAR MENSES: Primary | ICD-10-CM

## 2025-01-08 PROBLEM — Z78.9 NOT IMMUNE TO HEPATITIS B VIRUS: Status: RESOLVED | Noted: 2024-03-02 | Resolved: 2025-01-08

## 2025-01-08 PROCEDURE — 99213 OFFICE O/P EST LOW 20 MIN: CPT | Performed by: NURSE PRACTITIONER

## 2025-01-08 NOTE — PROGRESS NOTES
"PROBLEM GYNECOLOGICAL VISIT    Kay Barclay is a 19 y.o. female who presents today with complaint of irregular menses.  Her general medical history has been reviewed and she reports it as follows:    Past Medical History:   Diagnosis Date    Anxiety     Depression     Scoliosis      Past Surgical History:   Procedure Laterality Date    NO PAST SURGERIES       OB History          1    Para   1    Term   1       0    AB   0    Living   1         SAB   0    IAB   0    Ectopic   0    Multiple   0    Live Births   1               Social History     Tobacco Use    Smoking status: Never     Passive exposure: Never    Smokeless tobacco: Never   Vaping Use    Vaping status: Never Used   Substance Use Topics    Alcohol use: Never    Drug use: Never     Social History     Substance and Sexual Activity   Sexual Activity Not Currently    Partners: Male    Birth control/protection: Injection       Current Outpatient Medications   Medication Instructions    clindamycin (CLEOCIN T) 1 % lotion Topical, 2 times daily    Prenatal Vit-Fe Fumarate-FA (Prenatal Plus Vitamin/Mineral) 27-1 MG TABS 1 tablet, Oral, Daily       History of Present Illness:   Reports that she has only had menses x1 in 10/2024 since delivery of baby in 2024.  She had injection of Depoprovera directly after delivery and no contraception since then but denies any sexual activity at all.  She is exclusively breastfeeding.  She informs me that she prefers not to use any contraception at this time since she is not sexually active and just wants to make sure that is OK.    Review of Systems:  Review of Systems   Constitutional: Negative.    Gastrointestinal: Negative.    Genitourinary: Negative.        Physical Exam:  /75 (BP Location: Right arm, Patient Position: Sitting)   Pulse 87   Ht 5' 3\" (1.6 m)   Wt 53 kg (116 lb 12.8 oz)   LMP 10/22/2024 (Approximate)   BMI 20.69 kg/m²   Physical Exam  Constitutional:       General: She is not " in acute distress.  Neurological:      Mental Status: She is alert.   Skin:     General: Skin is warm and dry.   Vitals reviewed.       Assessment:   1. Irregular menses.    Plan:   1. Reassured patient that it is common to have irregular menses or even to be amenorrheic while exclusively breastfeeding.   2. Also reassured patient that she may resume contraception at any time at her discretion.   3. Return to office in 7/2025 for annual GYN exam and first pap smear.   4. Patient's depression screening was assessed with a PHQ-2 score of 0. Clinically patient does not have depression. No treatment is required.

## 2025-03-03 ENCOUNTER — TELEPHONE (OUTPATIENT)
Age: 20
End: 2025-03-03

## 2025-03-03 NOTE — TELEPHONE ENCOUNTER
Attempted to call patient but call dropped. Patient is scheduled with Dr Aurora Dill 4/16/5/25 but due to a provider change in schedule we have moved the appt to the same day 4/16/25 at 1120 with JEANE Mariscal at the [place] location. If patient calls back please give new appt info or to RS.

## 2025-04-22 ENCOUNTER — OFFICE VISIT (OUTPATIENT)
Age: 20
End: 2025-04-22
Payer: COMMERCIAL

## 2025-04-22 VITALS — WEIGHT: 118 LBS | BODY MASS INDEX: 20.9 KG/M2 | TEMPERATURE: 98.1 F

## 2025-04-22 DIAGNOSIS — L70.0 ACNE VULGARIS: Primary | ICD-10-CM

## 2025-04-22 PROCEDURE — 99213 OFFICE O/P EST LOW 20 MIN: CPT

## 2025-04-22 NOTE — PROGRESS NOTES
"[facility] Note     Patient Name: Kay Barclay  Encounter Date: 4/22/2025       Have you been cared for by a [facility] Dermatologist in the last 3 years and, if so, which description applies to you? Yes. I have been here within the last 3 years, and my medical history has NOT changed since that time. I am of child-bearing potential.     REVIEW OF SYSTEMS:  Have you recently had or currently have any of the following? No changes in my recent health.   PAST MEDICAL HISTORY:  Have you personally ever had or currently have any of the following?  If \"YES,\" then please provide more detail. No changes in my medical history.   HISTORY OF IMMUNOSUPPRESSION: Do you have a history of any of the following:  Systemic Immunosuppression such as Diabetes, Biologic or Immunotherapy, Chemotherapy, Organ Transplantation, Bone Marrow Transplantation or Prednisone?  No     Answering \"YES\" requires the addition of the dotphrase \"IMMUNOSUPPRESSED\" as the first diagnosis of the patient's visit.   FAMILY HISTORY:  Any \"first degree relatives\" (parent, brother, sister, or child) with the following?    No changes in my family's known health.   PATIENT EXPERIENCE:    Do you want the Dermatologist to perform a COMPLETE skin exam today including a clinical examination under the \"bra and underwear\" areas?  NO  If necessary, do we have your permission to call and leave a detailed message on your Preferred Phone number that includes your specific medical information?  Yes      No Known Allergies   Current Outpatient Medications:     clindamycin (CLEOCIN T) 1 % lotion, Apply topically 2 (two) times a day, Disp: 60 mL, Rfl: 1    Prenatal Vit-Fe Fumarate-FA (Prenatal Plus Vitamin/Mineral) 27-1 MG TABS, Take 1 tablet by mouth in the morning, Disp: 90 tablet, Rfl: 4              Whom besides the patient is providing clinical information about today's encounter?   NO ADDITIONAL HISTORIAN (patient alone provided " "history)    Physical Exam and Assessment/Plan by Diagnosis:    ACNE VULGARIS    Physical Exam:  Anatomic Location Affected:  Face and back  Morphological Description: closed and open comedones on forehead, single papule on back, post inflammatory hyperpigmentation seen on back    Additional History of Present Condition:   Patient presents as a follow up for her acne. She is using a benzoyl peroxide wash for her chest and back and the topical clindamycin 1% lotion. She notes her acne is doing okay. She is still breast feeding.       Plan today:    AM:  Continue benzoyl peroxide cleanser (over the counter products like Panoxyl, CeraVe and Neutrogena contain this product listed under \"active ingredients\". Can use for face and back.   Continue clindamycin 1% lotion to affected skin.  Moisturizer with SPF 30.        PM:  Gentle cleanser as above.  Continue clindamycin 1% lotion to affected skin.  Non-comedogenic moisturizer such as CeraVe, Cetaphil or Vanicream. Can be used on top of retinoid.    Discussed use of topical tretinoin once patient stops breastfeeding.   Follow up in 6 months, sooner for concerns.      Scribe Attestation      I,:  Matti Oseguera MA am acting as a scribe while in the presence of the attending physician.:       I,:  Loida Cool PA-C personally performed the services described in this documentation    as scribed in my presence.:             "

## 2025-07-01 ENCOUNTER — ANNUAL EXAM (OUTPATIENT)
Dept: OBGYN CLINIC | Facility: CLINIC | Age: 20
End: 2025-07-01

## 2025-07-01 VITALS
BODY MASS INDEX: 20.45 KG/M2 | DIASTOLIC BLOOD PRESSURE: 72 MMHG | OXYGEN SATURATION: 98 % | SYSTOLIC BLOOD PRESSURE: 110 MMHG | HEART RATE: 84 BPM | WEIGHT: 115.4 LBS | HEIGHT: 63 IN

## 2025-07-01 DIAGNOSIS — Z11.3 SCREEN FOR STD (SEXUALLY TRANSMITTED DISEASE): ICD-10-CM

## 2025-07-01 DIAGNOSIS — Z01.419 ENCOUNTER FOR ANNUAL ROUTINE GYNECOLOGICAL EXAMINATION: Primary | ICD-10-CM

## 2025-07-01 PROCEDURE — G0145 SCR C/V CYTO,THINLAYER,RESCR: HCPCS | Performed by: NURSE PRACTITIONER

## 2025-07-01 PROCEDURE — 87491 CHLMYD TRACH DNA AMP PROBE: CPT | Performed by: NURSE PRACTITIONER

## 2025-07-01 PROCEDURE — 99395 PREV VISIT EST AGE 18-39: CPT | Performed by: NURSE PRACTITIONER

## 2025-07-01 PROCEDURE — 87591 N.GONORRHOEAE DNA AMP PROB: CPT | Performed by: NURSE PRACTITIONER

## 2025-07-01 NOTE — PROGRESS NOTES
Annual Exam    Assessment   1. Encounter for annual routine gynecological examination  Liquid-based pap, screening      2. Screen for STD (sexually transmitted disease)  Chlamydia/GC amplified DNA by PCR        well woman       Plan       All questions answered.  Breast self exam technique reviewed and patient encouraged to perform self-exam monthly.  Contraception: abstinence.  Discussed healthy lifestyle modifications.  Follow up in 1 year.  Thin prep Pap smear.     Patient Instructions   PAP results can take up to 2 weeks  STD results can take a few days  Remember safe sex and condom use  Call with needs or concerns  Return in 1 year for annual GYN exam  Pt verbalized understanding of all discussed.      Subjective      Kay Barclay is a 20 y.o.  female who presents for annual well woman exam. Periods are regular every 28-30 days, lasting 6 days. No intermenstrual bleeding, spotting, or discharge.  No current partner  Pt had HPV vaccines ,  and     Current contraception: abstinence  History of abnormal Pap smear: First PAP today  Family history of uterine or ovarian cancer: no  Regular self breast exam: yes  History of abnormal mammogram: N/A  Family history of breast cancer: no  History of abnormal lipids: unknown  Menstrual History:  OB History          1    Para   1    Term   1       0    AB   0    Living   1         SAB   0    IAB   0    Ectopic   0    Multiple   0    Live Births   1                Menarche age: 12  Patient's last menstrual period was 2025 (exact date).       The following portions of the patient's history were reviewed and updated as appropriate: allergies, current medications, past family history, past medical history, past social history, past surgical history and problem list.    Review of Systems  Pertinent items are noted in HPI.      Objective      /72 (BP Location: Left arm, Patient Position: Sitting, Cuff Size: Standard)   Pulse 84   " Ht 5' 3\" (1.6 m)   Wt 52.3 kg (115 lb 6.4 oz)   LMP 06/09/2025 (Exact Date)   SpO2 98%   Breastfeeding Yes   BMI 20.44 kg/m²     General: alert and oriented, in no acute distress, alert, appears stated age, and cooperative   Heart: NSR   Lungs: clear to auscultation bilaterally, WNL respiratory effort, negative cough or SOB   Thyroid: Negative masses palpable   Abdomen: soft, non-tender, without masses or organomegaly palpable   Vulva: normal   Vagina: normal mucosa   Cervix: no bleeding following Pap, no cervical motion tenderness, and no lesions   Uterus: normal size, non-tender, normal shape and consistency   Adnexa: normal adnexa   Urethra: normal   Breasts: NT,negative masses palpable, negative discharge, or dimpling             "

## 2025-07-01 NOTE — PATIENT INSTRUCTIONS
PAP results can take up to 2 weeks  STD results can take a few days  Remember safe sex and condom use  Call with needs or concerns  Return in 1 year for annual GYN exam

## 2025-07-03 LAB
C TRACH DNA SPEC QL NAA+PROBE: NEGATIVE
N GONORRHOEA DNA SPEC QL NAA+PROBE: NEGATIVE

## 2025-07-07 LAB
LAB AP GYN PRIMARY INTERPRETATION: NORMAL
Lab: NORMAL

## 2025-07-28 ENCOUNTER — TELEPHONE (OUTPATIENT)
Dept: OBGYN CLINIC | Facility: CLINIC | Age: 20
End: 2025-07-28